# Patient Record
Sex: FEMALE | Race: WHITE | NOT HISPANIC OR LATINO | Employment: FULL TIME | URBAN - METROPOLITAN AREA
[De-identification: names, ages, dates, MRNs, and addresses within clinical notes are randomized per-mention and may not be internally consistent; named-entity substitution may affect disease eponyms.]

---

## 2022-08-19 ENCOUNTER — TELEPHONE (OUTPATIENT)
Dept: SURGERY | Facility: CLINIC | Age: 57
End: 2022-08-19
Payer: COMMERCIAL

## 2022-08-19 NOTE — TELEPHONE ENCOUNTER
Spoke with patient and appointment scheduled per referral. Details confirmed verbally with patient. Reminder slip placed in mail.

## 2022-08-19 NOTE — TELEPHONE ENCOUNTER
----- Message from Arthur Jones sent at 8/19/2022  9:50 AM CDT -----  Good morning,    The pt listed above is being referred from Bren[facility] Anders for (hernia affecting rectum severe symptoms). I have scanned the referral and records into . Please advise or contact pt to schedule appt at your earliest convenience.    Thank You,    [facility]

## 2022-08-25 ENCOUNTER — TELEPHONE (OUTPATIENT)
Dept: SURGERY | Facility: CLINIC | Age: 57
End: 2022-08-25
Payer: COMMERCIAL

## 2022-08-26 ENCOUNTER — TELEPHONE (OUTPATIENT)
Dept: SURGERY | Facility: CLINIC | Age: 57
End: 2022-08-26
Payer: COMMERCIAL

## 2022-08-26 ENCOUNTER — OFFICE VISIT (OUTPATIENT)
Dept: SURGERY | Facility: CLINIC | Age: 57
End: 2022-08-26
Payer: COMMERCIAL

## 2022-08-26 VITALS
SYSTOLIC BLOOD PRESSURE: 138 MMHG | DIASTOLIC BLOOD PRESSURE: 65 MMHG | WEIGHT: 127.69 LBS | HEIGHT: 63 IN | HEART RATE: 92 BPM | BODY MASS INDEX: 22.62 KG/M2

## 2022-08-26 DIAGNOSIS — K62.3 RECTAL PROLAPSE: Primary | ICD-10-CM

## 2022-08-26 PROCEDURE — 1159F PR MEDICATION LIST DOCUMENTED IN MEDICAL RECORD: ICD-10-PCS | Mod: CPTII,S$GLB,, | Performed by: SURGERY

## 2022-08-26 PROCEDURE — 99999 PR PBB SHADOW E&M-EST. PATIENT-LVL III: ICD-10-PCS | Mod: PBBFAC,,, | Performed by: SURGERY

## 2022-08-26 PROCEDURE — 3008F BODY MASS INDEX DOCD: CPT | Mod: CPTII,S$GLB,, | Performed by: SURGERY

## 2022-08-26 PROCEDURE — 99999 PR PBB SHADOW E&M-EST. PATIENT-LVL III: CPT | Mod: PBBFAC,,, | Performed by: SURGERY

## 2022-08-26 PROCEDURE — 3075F SYST BP GE 130 - 139MM HG: CPT | Mod: CPTII,S$GLB,, | Performed by: SURGERY

## 2022-08-26 PROCEDURE — 4010F ACE/ARB THERAPY RXD/TAKEN: CPT | Mod: CPTII,S$GLB,, | Performed by: SURGERY

## 2022-08-26 PROCEDURE — 3008F PR BODY MASS INDEX (BMI) DOCUMENTED: ICD-10-PCS | Mod: CPTII,S$GLB,, | Performed by: SURGERY

## 2022-08-26 PROCEDURE — 4010F PR ACE/ARB THEARPY RXD/TAKEN: ICD-10-PCS | Mod: CPTII,S$GLB,, | Performed by: SURGERY

## 2022-08-26 PROCEDURE — 3078F DIAST BP <80 MM HG: CPT | Mod: CPTII,S$GLB,, | Performed by: SURGERY

## 2022-08-26 PROCEDURE — 99204 PR OFFICE/OUTPT VISIT, NEW, LEVL IV, 45-59 MIN: ICD-10-PCS | Mod: S$GLB,,, | Performed by: SURGERY

## 2022-08-26 PROCEDURE — 3075F PR MOST RECENT SYSTOLIC BLOOD PRESS GE 130-139MM HG: ICD-10-PCS | Mod: CPTII,S$GLB,, | Performed by: SURGERY

## 2022-08-26 PROCEDURE — 99204 OFFICE O/P NEW MOD 45 MIN: CPT | Mod: S$GLB,,, | Performed by: SURGERY

## 2022-08-26 PROCEDURE — 1159F MED LIST DOCD IN RCRD: CPT | Mod: CPTII,S$GLB,, | Performed by: SURGERY

## 2022-08-26 PROCEDURE — 3078F PR MOST RECENT DIASTOLIC BLOOD PRESSURE < 80 MM HG: ICD-10-PCS | Mod: CPTII,S$GLB,, | Performed by: SURGERY

## 2022-08-26 RX ORDER — CLONAZEPAM 0.5 MG/1
0.5 TABLET ORAL 2 TIMES DAILY PRN
COMMUNITY

## 2022-08-26 RX ORDER — AMLODIPINE BESYLATE 5 MG/1
5 TABLET ORAL DAILY
COMMUNITY

## 2022-08-26 RX ORDER — ESCITALOPRAM OXALATE 20 MG/1
20 TABLET ORAL DAILY
COMMUNITY

## 2022-08-26 RX ORDER — ATOMOXETINE 80 MG/1
80 CAPSULE ORAL DAILY
COMMUNITY

## 2022-08-26 RX ORDER — DEXLANSOPRAZOLE 60 MG/1
60 CAPSULE, DELAYED RELEASE ORAL DAILY
Status: ON HOLD | COMMUNITY
End: 2023-10-18 | Stop reason: HOSPADM

## 2022-08-26 RX ORDER — ENALAPRIL MALEATE 20 MG/1
20 TABLET ORAL DAILY
Status: ON HOLD | COMMUNITY
End: 2023-10-04 | Stop reason: HOSPADM

## 2022-08-26 NOTE — TELEPHONE ENCOUNTER
Spoke with pt to inform of MRI appt made on 9/15/22 and clinic appt made on 9/16/22 with Dr. Zamora. Will confirm time and date with Dr. Zamora regarding appt times and confirm with patient. Pt verbalized understanding.

## 2022-08-30 ENCOUNTER — PATIENT MESSAGE (OUTPATIENT)
Dept: UROGYNECOLOGY | Facility: CLINIC | Age: 57
End: 2022-08-30
Payer: COMMERCIAL

## 2022-09-15 ENCOUNTER — HOSPITAL ENCOUNTER (OUTPATIENT)
Dept: RADIOLOGY | Facility: HOSPITAL | Age: 57
Discharge: HOME | End: 2022-09-15
Attending: SURGERY
Payer: COMMERCIAL

## 2022-09-15 DIAGNOSIS — K62.3 RECTAL PROLAPSE: ICD-10-CM

## 2022-09-15 PROCEDURE — 72195 MRI DEFECOGRAPHY: ICD-10-PCS | Mod: 26,,, | Performed by: RADIOLOGY

## 2022-09-15 PROCEDURE — 72195 MRI PELVIS W/O DYE: CPT | Mod: 26,,, | Performed by: RADIOLOGY

## 2022-09-15 PROCEDURE — 72195 MRI PELVIS W/O DYE: CPT | Mod: TC

## 2022-09-16 ENCOUNTER — OFFICE VISIT (OUTPATIENT)
Dept: SURGERY | Facility: CLINIC | Age: 57
End: 2022-09-16
Payer: COMMERCIAL

## 2022-09-16 ENCOUNTER — OFFICE VISIT (OUTPATIENT)
Dept: UROGYNECOLOGY | Facility: CLINIC | Age: 57
End: 2022-09-16
Payer: COMMERCIAL

## 2022-09-16 VITALS
HEART RATE: 73 BPM | HEIGHT: 63 IN | DIASTOLIC BLOOD PRESSURE: 76 MMHG | BODY MASS INDEX: 23.71 KG/M2 | WEIGHT: 133.81 LBS | SYSTOLIC BLOOD PRESSURE: 140 MMHG

## 2022-09-16 VITALS
HEART RATE: 73 BPM | DIASTOLIC BLOOD PRESSURE: 76 MMHG | SYSTOLIC BLOOD PRESSURE: 140 MMHG | WEIGHT: 133.81 LBS | BODY MASS INDEX: 23.71 KG/M2 | HEIGHT: 63 IN

## 2022-09-16 DIAGNOSIS — N39.46 URINARY INCONTINENCE, MIXED: Primary | ICD-10-CM

## 2022-09-16 DIAGNOSIS — N99.3 VAGINAL VAULT PROLAPSE, POSTHYSTERECTOMY: ICD-10-CM

## 2022-09-16 DIAGNOSIS — K62.3 RECTAL PROLAPSE: ICD-10-CM

## 2022-09-16 DIAGNOSIS — N81.6 RECTOCELE, FEMALE: ICD-10-CM

## 2022-09-16 DIAGNOSIS — K59.00 CONSTIPATION, UNSPECIFIED CONSTIPATION TYPE: ICD-10-CM

## 2022-09-16 DIAGNOSIS — R19.8 STRAINING WITH STOOLS: ICD-10-CM

## 2022-09-16 DIAGNOSIS — N95.2 VAGINAL ATROPHY: ICD-10-CM

## 2022-09-16 DIAGNOSIS — N81.11 CYSTOCELE, MIDLINE: ICD-10-CM

## 2022-09-16 DIAGNOSIS — K62.3 RECTAL PROLAPSE: Primary | ICD-10-CM

## 2022-09-16 DIAGNOSIS — M79.18 MYALGIA OF PELVIC FLOOR: ICD-10-CM

## 2022-09-16 PROCEDURE — 51701 PR INSERTION OF NON-INDWELLING BLADDER CATHETERIZATION FOR RESIDUAL UR: ICD-10-PCS | Mod: S$GLB,,, | Performed by: OBSTETRICS & GYNECOLOGY

## 2022-09-16 PROCEDURE — 99999 PR PBB SHADOW E&M-EST. PATIENT-LVL IV: ICD-10-PCS | Mod: PBBFAC,,, | Performed by: OBSTETRICS & GYNECOLOGY

## 2022-09-16 PROCEDURE — 4010F ACE/ARB THERAPY RXD/TAKEN: CPT | Mod: CPTII,S$GLB,, | Performed by: SURGERY

## 2022-09-16 PROCEDURE — 1159F PR MEDICATION LIST DOCUMENTED IN MEDICAL RECORD: ICD-10-PCS | Mod: CPTII,S$GLB,, | Performed by: OBSTETRICS & GYNECOLOGY

## 2022-09-16 PROCEDURE — 51701 INSERT BLADDER CATHETER: CPT | Mod: S$GLB,,, | Performed by: OBSTETRICS & GYNECOLOGY

## 2022-09-16 PROCEDURE — 99213 PR OFFICE/OUTPT VISIT, EST, LEVL III, 20-29 MIN: ICD-10-PCS | Mod: S$GLB,,, | Performed by: SURGERY

## 2022-09-16 PROCEDURE — 3008F BODY MASS INDEX DOCD: CPT | Mod: CPTII,S$GLB,, | Performed by: SURGERY

## 2022-09-16 PROCEDURE — 87086 URINE CULTURE/COLONY COUNT: CPT | Performed by: OBSTETRICS & GYNECOLOGY

## 2022-09-16 PROCEDURE — 3077F SYST BP >= 140 MM HG: CPT | Mod: CPTII,S$GLB,, | Performed by: SURGERY

## 2022-09-16 PROCEDURE — 3078F PR MOST RECENT DIASTOLIC BLOOD PRESSURE < 80 MM HG: ICD-10-PCS | Mod: CPTII,S$GLB,, | Performed by: SURGERY

## 2022-09-16 PROCEDURE — 3077F SYST BP >= 140 MM HG: CPT | Mod: CPTII,S$GLB,, | Performed by: OBSTETRICS & GYNECOLOGY

## 2022-09-16 PROCEDURE — 3078F DIAST BP <80 MM HG: CPT | Mod: CPTII,S$GLB,, | Performed by: SURGERY

## 2022-09-16 PROCEDURE — 4010F ACE/ARB THERAPY RXD/TAKEN: CPT | Mod: CPTII,S$GLB,, | Performed by: OBSTETRICS & GYNECOLOGY

## 2022-09-16 PROCEDURE — 3008F PR BODY MASS INDEX (BMI) DOCUMENTED: ICD-10-PCS | Mod: CPTII,S$GLB,, | Performed by: OBSTETRICS & GYNECOLOGY

## 2022-09-16 PROCEDURE — 4010F PR ACE/ARB THEARPY RXD/TAKEN: ICD-10-PCS | Mod: CPTII,S$GLB,, | Performed by: OBSTETRICS & GYNECOLOGY

## 2022-09-16 PROCEDURE — 1160F RVW MEDS BY RX/DR IN RCRD: CPT | Mod: CPTII,S$GLB,, | Performed by: OBSTETRICS & GYNECOLOGY

## 2022-09-16 PROCEDURE — 3078F DIAST BP <80 MM HG: CPT | Mod: CPTII,S$GLB,, | Performed by: OBSTETRICS & GYNECOLOGY

## 2022-09-16 PROCEDURE — 99204 OFFICE O/P NEW MOD 45 MIN: CPT | Mod: 25,S$GLB,, | Performed by: OBSTETRICS & GYNECOLOGY

## 2022-09-16 PROCEDURE — 3077F PR MOST RECENT SYSTOLIC BLOOD PRESSURE >= 140 MM HG: ICD-10-PCS | Mod: CPTII,S$GLB,, | Performed by: SURGERY

## 2022-09-16 PROCEDURE — 4010F PR ACE/ARB THEARPY RXD/TAKEN: ICD-10-PCS | Mod: CPTII,S$GLB,, | Performed by: SURGERY

## 2022-09-16 PROCEDURE — 99999 PR PBB SHADOW E&M-EST. PATIENT-LVL IV: CPT | Mod: PBBFAC,,, | Performed by: OBSTETRICS & GYNECOLOGY

## 2022-09-16 PROCEDURE — 3008F PR BODY MASS INDEX (BMI) DOCUMENTED: ICD-10-PCS | Mod: CPTII,S$GLB,, | Performed by: SURGERY

## 2022-09-16 PROCEDURE — 3008F BODY MASS INDEX DOCD: CPT | Mod: CPTII,S$GLB,, | Performed by: OBSTETRICS & GYNECOLOGY

## 2022-09-16 PROCEDURE — 99999 PR PBB SHADOW E&M-EST. PATIENT-LVL III: CPT | Mod: PBBFAC,,, | Performed by: SURGERY

## 2022-09-16 PROCEDURE — 1159F MED LIST DOCD IN RCRD: CPT | Mod: CPTII,S$GLB,, | Performed by: OBSTETRICS & GYNECOLOGY

## 2022-09-16 PROCEDURE — 1160F PR REVIEW ALL MEDS BY PRESCRIBER/CLIN PHARMACIST DOCUMENTED: ICD-10-PCS | Mod: CPTII,S$GLB,, | Performed by: OBSTETRICS & GYNECOLOGY

## 2022-09-16 PROCEDURE — 3078F PR MOST RECENT DIASTOLIC BLOOD PRESSURE < 80 MM HG: ICD-10-PCS | Mod: CPTII,S$GLB,, | Performed by: OBSTETRICS & GYNECOLOGY

## 2022-09-16 PROCEDURE — 99999 PR PBB SHADOW E&M-EST. PATIENT-LVL III: ICD-10-PCS | Mod: PBBFAC,,, | Performed by: SURGERY

## 2022-09-16 PROCEDURE — 99204 PR OFFICE/OUTPT VISIT, NEW, LEVL IV, 45-59 MIN: ICD-10-PCS | Mod: 25,S$GLB,, | Performed by: OBSTETRICS & GYNECOLOGY

## 2022-09-16 PROCEDURE — 99213 OFFICE O/P EST LOW 20 MIN: CPT | Mod: S$GLB,,, | Performed by: SURGERY

## 2022-09-16 PROCEDURE — 3077F PR MOST RECENT SYSTOLIC BLOOD PRESSURE >= 140 MM HG: ICD-10-PCS | Mod: CPTII,S$GLB,, | Performed by: OBSTETRICS & GYNECOLOGY

## 2022-09-16 RX ORDER — MELOXICAM 15 MG/1
15 TABLET ORAL EVERY MORNING
COMMUNITY
Start: 2022-09-01 | End: 2024-03-01

## 2022-09-16 RX ORDER — GABAPENTIN 300 MG/1
CAPSULE ORAL
COMMUNITY
Start: 2022-09-01

## 2022-09-16 RX ORDER — BUSPIRONE HYDROCHLORIDE 15 MG/1
TABLET ORAL
COMMUNITY
Start: 2022-08-22 | End: 2024-03-01

## 2022-09-16 NOTE — PROGRESS NOTES
Colon & Rectal Surgery Clinic Follow Up    HPI:   Charla Sharif is a 57 y.o. female who presents for follow up of rectal prolapse.  Continues to have prolapsing tissue that she has to manually reduce.  Now reporting fecal incontinence.  Does not feel that she evacuates completely after bowel movements.  Recently obtained custody of her grandchildren.         Objective:   Vitals:    09/16/22 0932   BP: (!) 140/76   Pulse: 73        Physical Exam   Gen: well developed female, NAD  HEENT: normocephalic, atraumatic, PERRL, EOMI   CV: RRR, no murmurs  Resp: nonlabored, CTAB   Abd: soft, NTND   MSK: no gross deformities   Anorectal: some mucosal prolapse with straining, no full thickness prolapse on exam       Impression:     1.  Anatomic findings: Postop change of hysterectomy.     2.  Functional evaluation: Normal anorectal junction location and widened levator hiatus width at rest with mild/Grade 1 (2-4 cm) descent and mild/Grade 1 (6-8 cm) widening during defecation.     3.  Anterior compartment findings: Grade 1 cystocele.     4.  Middle compartment findings: Normal.     5.  Posterior compartment findings: Grade 1 peritoneocele and moderate rectocele.     Electronically signed by resident: Randy Issa  Date:                                            09/15/2022  Time:                                           14:52     Electronically signed by: Ajith Muro MD  Date:                                            09/15/2022  Time:                                           15:39    Assessment and Plan:   Charla Sharif  is a 57 y.o. female who presents for follow up of rectal prolapse and pelvic floor dysfunction    - Patient to see Dr. Barton later today for evaluation of anterior compartment pathology   - Patient does have mucosal prolapse on exam, no full thickness prolapse seen.  We discussed options for repair   - Will discuss options with Dr. Barton and follow up with Ms. Sharif.  She has recently acquired  custody of her grandchildren and will most likely delay any surgical intervention until the beginning of the year.       Adry Zamora MD  Staff Surgeon   Colon & Rectal Surgery

## 2022-09-16 NOTE — PROGRESS NOTES
[facility] - UROGYNECOLOGY  [address] [address]    Charla Sharif  [id number]  1965 September 16, 2022    Consulting Physician: Adry Zamora MD   GYN: Franco Lala MD ([place])  Primary M.D.: To Obtain Unable    Chief Complaint   Patient presents with    Vaginal Prolapse    Urinary Retention       HPI:     1)  UI:  (+) BECKY > (+) UUI  X 1 year.  (+) pads (for fecal issues, no urine on pads).  Daytime frequency: Q 1 hours.  Nocturia: Yes: 2/night.   (+) dysuria,  (--) hematuria,  (--) frequent UTIs.  (--) complete bladder emptying. +PV dribbling. DV with variable 2nd amount.     2)  POP:  Present x 1-2 months. To introitus.  Symptoms:(+)  pressure.  (--) vaginal bleeding. (--) vaginal discharge. (+) sexually active.  (--) dyspareunia.  (+)  Vaginal dryness. (+) vaginal estrogen use--uses 0.5 3x/week--not consistent.     3)  BM:  (+) constipation/straining x several months since rectal prolapse. Was not constipated before that. Taking linzess-not sure is helping. Has BM Q4 days.   (--) chronic diarrhea. (--) hematochezia.  + fecal incontinence (2x/week).  (--) fecal smearing/urgency.  (--) complete evacuation.  NO splinting.   --2 PPD for fecal smearing   --mucus discharge+  --rectal prolapse x 2 months  --8/2022 dynamic MRI:  Impression:  1.  Anatomic findings: Postop change of hysterectomy.  2.  Functional evaluation: Normal anorectal junction location and widened levator hiatus width at rest with mild/Grade 1 (2-4 cm) descent and mild/Grade 1 (6-8 cm) widening during defecation.  3.  Anterior compartment findings: Grade 1 cystocele.  4.  Middle compartment findings: Normal.  5.  Posterior compartment findings: Grade 1 peritoneocele and moderate rectocele.    Past Medical History  Past Medical History:   Diagnosis Date    ADD (attention deficit disorder)     Anxiety    HTN  Sciatica: takes gabapentin + mobic daily     Past Surgical History  Past Surgical History:   Procedure  Laterality Date    CHOLECYSTECTOMY      HYSTERECTOMY     --LSC esther     Hysterectomy: Yes   Date: .  Indication: emergent for ovarian cyst rupture.    Type: xlap/Pfannenstiel  Cervix present: No  Ovaries present: No  Other procedures at time of hysterectomy:  none    Past Ob History     x 2.  C/s x 0.    Largest infant weight: 9#  yes FAVD. yes episiotomy.      Gynecologic History  LMP: No LMP recorded.  Age of menarche: 8th grade  Age of menopause: with hyst  Menstrual history: h/o menorrhagia  Pap test: post IVA.  History of abnormal paps: No.  History of STIs:  No  Mammogram: Date of last:  ([facility]).  Result: Normal per report.  Colonoscopy: Date of last:  ([facility]).  Result:  normal per report.  Repeat due:  per CRS/PCP.    DEXA:  none    Family History  History reviewed. No pertinent family history.   Colon CA: No  Breast CA: No  GYN CA: No   CA: No    Social History  Social History     Tobacco Use   Smoking Status Not on file   Smokeless Tobacco Not on file     Cessation date: +social smoking and vaping   Social History     Substance and Sexual Activity   Alcohol Use None   .    Social History     Substance and Sexual Activity   Drug Use Not on file     The patient is .  Resides in [place] [address].  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 Takes care of parents and getting custody of grandchildren.     Allergies  Review of patient's allergies indicates:   Allergen Reactions    Promethazine Other (See Comments)     sweats       Medications  Current Outpatient Medications on File Prior to Visit   Medication Sig Dispense Refill    amLODIPine (NORVASC) 5 MG tablet Take 5 mg by mouth once daily.      atomoxetine (STRATTERA) 80 MG capsule Take 80 mg by mouth once daily.      busPIRone (BUSPAR) 15 MG tablet TAKE 1/2 TABLET BY MOUTH 3 TIMES DAILY      clonazePAM (KLONOPIN) 0.5 MG tablet Take 0.5 mg by mouth 2 (two) times daily as needed for  "Anxiety.      dexlansoprazole (DEXILANT) 60 mg capsule Take 60 mg by mouth once daily.      enalapril (VASOTEC) 20 MG tablet Take 20 mg by mouth once daily.      EScitalopram oxalate (LEXAPRO) 20 MG tablet Take 20 mg by mouth once daily.      gabapentin (NEURONTIN) 300 MG capsule TAKE 1 CAPSULE BY MOUTH 3 TIMES DAILY      linaCLOtide (LINZESS) 290 mcg Cap capsule Take 290 mcg by mouth before breakfast.      meloxicam (MOBIC) 15 MG tablet Take 15 mg by mouth every morning.       No current facility-administered medications on file prior to visit.       Review of Systems A 14 point ROS was reviewed with pertinent positives as noted above in the history of present illness.      Constitutional: negative  Eyes: negative  Endocrine: negative  Gastrointestinal: negative  Cardiovascular: negative  Respiratory: negative  Allergic/Immunologic: negative  Integumentary: negative  Psychiatric: negative  Musculoskeletal: negative   Ear/Nose/Throat: negative  Neurologic: negative  Genitourinary: SEE HPI  Hematologic/Lymphatic: negative   Breast: negative    Urogynecologic Exam  BP (!) 140/76   Pulse 73   Ht 5' 2.5" (1.588 m)   Wt 60.7 kg (133 lb 13.1 oz)   BMI 24.09 kg/m²     GENERAL APPEARANCE:  The patient is well-developed, well-nourished.  Neck:  Supple with no thyromegaly, no carotid bruits.  Heart:  Regular rate and rhythm, no murmurs, rubs or gallops.  Lungs:  Clear.  No CVA tenderness.  Abdomen:  Soft, nontender, nondistended, no hepatosplenomegaly.  Incisions:  LSC and Pfannenstiel well-healed    PELVIC:    External genitalia:  Normal Bartholins, Skenes and labia bilaterally.    Urethra:  No caruncle, diverticulum or masses.  (+) hypermobility.    Vagina:  Atrophy (+) , no bladder masses or tender, no discharge.  +TTP LV B.  Cervix:  absent  Uterus: uterus absent  Adnexa: Not palpable.    POP-Q:  Aa 0; Ba 0; C -8; Ap 0; Bp 0.  Genital hiatus 4, perineal body 2, total vaginal length 10.    NEUROLOGIC:  Cranial nerves 2 " through 12 intact.  Strength 5/5.  DTRs 2+ lower extremities.  S2 through 4 normal.  Sacral reflexes intact.    EXT: NOVAK, 2+ pulses bilaterally, no C/C/E    COUGH STRESS TEST:  negative  KEGEL: 1 /5    RECTAL:    External:  Normal, (--) hemorrhoids, (--) dovetailing.   Internal:   (--) tenderness, (--) masses, Normal resting tone, Normal active tone.    PVR: 40 mL    Impression    No diagnosis found.    Initial Plan  The patient was counseled regarding these issues. The patient was given a summary sheet containing each of these issues with possible options for evaluation and management. When appropriate, we also reviewed computer-generated diagrams specific to their diagnoses..  All questions were addressed to the patient's satisfaction.    1) Rectal prolapse:  --management per Dr. Zamora in CRS  --will work to minimize straining with BMs (see below)    2)  Vaginal atrophy (dryness):  Use 0.5 gram of estrogen cream in vagina nightly x 2 weeks, then twice a week thereafter.    --will help general discomfort and drynes    3)  Mixed urinary incontinence, urge < stress:    --Empty bladder every 3 hours.  Empty well: wait a minute, lean forward on toilet.    --Avoid dietary irritants (see sheet).  Keep diary x 3-5 days to determine your irritants.  --start pelvic floor PT  --URGE: consider medication in future. Takes 2-4 weeks to see if will have effect.  For dry mouth: get sour, sugar free lozenge or gum.    --STRESS:  Pessary vs. Sling.    --UDS preop to see if needs concomitant MUS    4)  constipation/straining with stools:  --continue daily linzess  --hydrate well  --start fiber supplement  --would start pelvic floor PT to work on minimizing straining with BMs; also needs plan to optimize constipation control (taking 290 mcg linzess with ? Improvement)--may be impacted by rectal prolapse    --has appt with TYRONE Caban; requests PT closer to home; given # for [facility] PT--referral sent; would keep [facility] appt if [facility]  appt is far in future  --Pavithra Uribe. [facility] in [place], [place]:  (p) [phone].  (f) [phone].       5)  Stage 2 cystocele/rectocele, stage 1 vaginal vault prolapse:  --discussed  --options: observation vs pessary/PT (won't get rid of bulge but can help pressure) vs surgery   --if surgery: robotic/laparoscopic sacral colpopexy vs uterosacral suspension (would lean toward ASC due to rectal prolapse)   --if surgery: discuss plan with Dr. Zamora (would she do ventral mesh rectopexy or suture rectopexy?)   --if surgery: needs UDS/cysto preop to determine need for concomitant MUS for BECKY   --if surgery: needs clearance per PCP + labs (CBC, CMP, T&S)/EKG   --if surgery: would start pelvic floor PT to work on minimizing straining with BMs; also needs plan to optimize constipation control (taking 290 mcg linzess with ? Improvement)--may be impacted by rectal prolapse    --has appt with TYRONE Caban; requests PT closer to home; given # for [facility] PT--referral sent; would keep Arsh appt if [facility] appt is far in future  --Pavithra Uribe. [facility] [place], [place]:  (p) [phone].  (f) [phone].         6)  Will discuss with Dr. Zamora and make plan for concomitant surgery. Patient would like to wait until late 2022/early 2023 for OR since has recently gained custody of her grandchildren and needs to settle affairs.      Approximately 45 min were spent in consult, 90 % in discussion.   Patient's  was present for the entire exam and discussion.  Thank you for requesting consultation of your patient.  I look forward to participating in their care.    Carrol Barton  Female Pelvic Medicine and Reconstructive Surgery  [facility] [place], [place]

## 2022-09-18 PROBLEM — K59.00 CONSTIPATION: Status: ACTIVE | Noted: 2022-09-18

## 2022-09-18 PROBLEM — N39.46 URINARY INCONTINENCE, MIXED: Status: ACTIVE | Noted: 2022-09-18

## 2022-09-18 PROBLEM — K62.3 RECTAL PROLAPSE: Status: ACTIVE | Noted: 2022-09-18

## 2022-09-18 PROBLEM — N81.11 CYSTOCELE, MIDLINE: Status: ACTIVE | Noted: 2022-09-18

## 2022-09-18 PROBLEM — R19.8 STRAINING WITH STOOLS: Status: ACTIVE | Noted: 2022-09-18

## 2022-09-18 PROBLEM — N95.2 VAGINAL ATROPHY: Status: ACTIVE | Noted: 2022-09-18

## 2022-09-18 PROBLEM — N99.3 VAGINAL VAULT PROLAPSE, POSTHYSTERECTOMY: Status: ACTIVE | Noted: 2022-09-18

## 2022-09-18 PROBLEM — M79.18 MYALGIA OF PELVIC FLOOR: Status: ACTIVE | Noted: 2022-09-18

## 2022-09-18 PROBLEM — N81.6 RECTOCELE, FEMALE: Status: ACTIVE | Noted: 2022-09-18

## 2022-09-18 LAB — BACTERIA UR CULT: NO GROWTH

## 2022-09-18 RX ORDER — ESTRADIOL 0.1 MG/G
CREAM VAGINAL
Qty: 42.5 G | Refills: 11 | Status: SHIPPED | OUTPATIENT
Start: 2022-09-18 | End: 2023-06-12 | Stop reason: SDUPTHER

## 2022-09-19 ENCOUNTER — TELEPHONE (OUTPATIENT)
Dept: UROGYNECOLOGY | Facility: CLINIC | Age: 57
End: 2022-09-19
Payer: COMMERCIAL

## 2022-09-19 NOTE — TELEPHONE ENCOUNTER
----- Message from Carrol Barton MD sent at 9/18/2022  9:30 PM CDT -----  Please let the patient know urine C&S was negative for infection.  Please call--she doesn't have portal set up. Thanks!

## 2022-09-19 NOTE — TELEPHONE ENCOUNTER
Spoke with patient, stated prescription was sent in to her pharmacy. Patient verbalized understanding, call ended.     Also, faxed PT referral this morning.

## 2022-09-19 NOTE — TELEPHONE ENCOUNTER
Spoke to patient.  Let the patient know urine C&S was negative for infection.  Patient verbalized understanding.  Call ended.

## 2022-09-19 NOTE — TELEPHONE ENCOUNTER
----- Message from Carrol Barton MD sent at 9/18/2022  9:13 PM CDT -----  Regarding: please let patient know that I sent Rx for vaginal estrogen to start  Hi: can you please let patient know that I sent in Rx for vaginal estrogen for her to start. This will help vaginal dryness and keep tissue healthy for surgery.  Please  and start.  Does not increase risk of breast cancer from general population risk and does not increase risk of blood clots. Thanks!    Vaginal atrophy (dryness):  Use 1 gram of estrogen cream in vagina nightly x 2 weeks, then twice a week thereafter.    Rx sent to:\[facility]\"" pharmacy    Please also remind her to start PT in [place] or [place], whichever can get her in sooner.     Thanks!

## 2022-11-10 ENCOUNTER — TELEPHONE (OUTPATIENT)
Dept: SURGERY | Facility: CLINIC | Age: 57
End: 2022-11-10
Payer: COMMERCIAL

## 2022-11-10 NOTE — TELEPHONE ENCOUNTER
"Spoke with patient regarding upcoming surgery with Dr. Zamora and Dr. Gavin. Explained that Dr. Zamora is still out on maternity leave but is expected to return mid December. Pt stated, "she would like to have procedure in December because her  will be off from work and able to drive her into city." Will update upon Dr. Zamora return to schedule in December if possible. Pt verbalized understanding.       ----- Message from Rodriguez Irby sent at 11/10/2022 12:20 PM CST -----  Contact: Patient  The pt called and would like to have someone call her back    This is regarding surgery    The pt can be reached at [phone]    "

## 2022-12-13 ENCOUNTER — TELEPHONE (OUTPATIENT)
Dept: UROGYNECOLOGY | Facility: CLINIC | Age: 57
End: 2022-12-13
Payer: COMMERCIAL

## 2022-12-13 ENCOUNTER — TELEPHONE (OUTPATIENT)
Dept: SURGERY | Facility: CLINIC | Age: 57
End: 2022-12-13
Payer: COMMERCIAL

## 2022-12-13 NOTE — TELEPHONE ENCOUNTER
"Spoke with patient regarding procedure date. Informed pt that we have reached out to Dr. Barton's office letting her know that Dr. Zamora was back in clinic as of 12/12. Will reach out again to update providers. Pt states, "she needs to have procedure in December while  is still in town. Expected to leave in January, 2023."      ----- Message from Kate Schofield sent at 12/13/2022 11:11 AM CST -----  Contact: pt  Pt states she was under the impression she was suppose to have a procedure scheduled for next week. She would like to speak to the nurse in regards to when that will be scheduled.     Confirmed contact below:  Contact Name:Charlaacacia Sharif  Phone Number: [phone]          "

## 2022-12-13 NOTE — TELEPHONE ENCOUNTER
Called patient. Informed her that Dr. Barton's surgery schedule is booking out until March. Patient states once she has a date for surgery she will be able to arrange a ride. Patient wondering if she can come for visit earlier than the 23rd. Will look at Dr. Barton and Sera's schedule to look for sooner date. Patient also states she has decreased her vaping. States she only does it a few times per day.

## 2022-12-13 NOTE — TELEPHONE ENCOUNTER
Called patient. Patient was under the impression she was having her combined surgery with Dr. Barton and Dr. Zamora next week. Informed patient that Dr. Zamora just got back from maternity leave yesterday and that last we left it Dr. Barton and Dr. Zamora were going to discuss a surgical plan after she returned from maternity leave. Patient states she needs to have the surgery done in December since her  will not be available after that to take her to the hospital for the surgery. Informed patient that it is likely we aren't going to be able to do her surgery in December, however I will look into other resources for helping her get transportation to the hospital.    In the meantime, patient agreed to come in on 12/23 at 10am to discuss surgical plan with Sandra. At that time we can discuss a possible date.

## 2022-12-23 ENCOUNTER — TELEPHONE (OUTPATIENT)
Dept: UROGYNECOLOGY | Facility: CLINIC | Age: 57
End: 2022-12-23
Payer: COMMERCIAL

## 2022-12-23 ENCOUNTER — OFFICE VISIT (OUTPATIENT)
Dept: UROGYNECOLOGY | Facility: CLINIC | Age: 57
End: 2022-12-23
Payer: COMMERCIAL

## 2022-12-23 DIAGNOSIS — N81.11 CYSTOCELE, MIDLINE: ICD-10-CM

## 2022-12-23 DIAGNOSIS — N39.46 URINARY INCONTINENCE, MIXED: Primary | ICD-10-CM

## 2022-12-23 DIAGNOSIS — M79.18 MYALGIA OF PELVIC FLOOR: ICD-10-CM

## 2022-12-23 DIAGNOSIS — K59.00 CONSTIPATION, UNSPECIFIED CONSTIPATION TYPE: ICD-10-CM

## 2022-12-23 DIAGNOSIS — N99.3 VAGINAL VAULT PROLAPSE, POSTHYSTERECTOMY: ICD-10-CM

## 2022-12-23 DIAGNOSIS — K62.3 RECTAL PROLAPSE: ICD-10-CM

## 2022-12-23 DIAGNOSIS — R19.8 STRAINING WITH STOOLS: ICD-10-CM

## 2022-12-23 DIAGNOSIS — N95.2 VAGINAL ATROPHY: ICD-10-CM

## 2022-12-23 DIAGNOSIS — N81.6 RECTOCELE, FEMALE: ICD-10-CM

## 2022-12-23 PROCEDURE — 1160F PR REVIEW ALL MEDS BY PRESCRIBER/CLIN PHARMACIST DOCUMENTED: ICD-10-PCS | Mod: CPTII,95,, | Performed by: OBSTETRICS & GYNECOLOGY

## 2022-12-23 PROCEDURE — 4010F PR ACE/ARB THEARPY RXD/TAKEN: ICD-10-PCS | Mod: CPTII,95,, | Performed by: OBSTETRICS & GYNECOLOGY

## 2022-12-23 PROCEDURE — 1160F RVW MEDS BY RX/DR IN RCRD: CPT | Mod: CPTII,95,, | Performed by: OBSTETRICS & GYNECOLOGY

## 2022-12-23 PROCEDURE — 4010F ACE/ARB THERAPY RXD/TAKEN: CPT | Mod: CPTII,95,, | Performed by: OBSTETRICS & GYNECOLOGY

## 2022-12-23 PROCEDURE — 99213 OFFICE O/P EST LOW 20 MIN: CPT | Mod: 95,,, | Performed by: OBSTETRICS & GYNECOLOGY

## 2022-12-23 PROCEDURE — 1159F PR MEDICATION LIST DOCUMENTED IN MEDICAL RECORD: ICD-10-PCS | Mod: CPTII,95,, | Performed by: OBSTETRICS & GYNECOLOGY

## 2022-12-23 PROCEDURE — 99213 PR OFFICE/OUTPT VISIT, EST, LEVL III, 20-29 MIN: ICD-10-PCS | Mod: 95,,, | Performed by: OBSTETRICS & GYNECOLOGY

## 2022-12-23 PROCEDURE — 1159F MED LIST DOCD IN RCRD: CPT | Mod: CPTII,95,, | Performed by: OBSTETRICS & GYNECOLOGY

## 2022-12-23 NOTE — PROGRESS NOTES
Established Patient - Audio Only Telehealth Visit     The patient location is: home  The chief complaint leading to consultation is: follow up  Visit type: Virtual visit with audio only (telephone)  Total time spent with patient: 20 min    The reason for the audio only service rather than synchronous audio and video virtual visit was related to technical difficulties or patient preference/necessity.     Each patient to whom I provide medical services by telemedicine is:  (1) informed of the relationship between the physician and patient and the respective role of any other health care provider with respect to management of the patient; and (2) notified that they may decline to receive medical services by telemedicine and may withdraw from such care at any time. Patient verbally consented to receive this service via voice-only telephone call.      Urogyn follow up  12/23/2022    [facility]  [address] [place] [id number]    Charla Sharif  [id number]  1965      Charla Sharif is a 57 y.o. here for a urogyn follow up. The patient's last visit with me was on 9/16/2022.    1)  UI:  (+) BECKY > (+) UUI  X 1 year.  (+) pads (for fecal issues, no urine on pads).  Daytime frequency: Q 1 hours.  Nocturia: Yes: 2/night.   (+) dysuria,  (--) hematuria,  (--) frequent UTIs.  (--) complete bladder emptying. +PV dribbling. DV with variable 2nd amount.     2)  POP:  Present x 1-2 months. To introitus.  Symptoms:(+)  pressure.  (--) vaginal bleeding. (--) vaginal discharge. (+) sexually active.  (--) dyspareunia.  (+)  Vaginal dryness. (+) vaginal estrogen use--uses 0.5 3x/week--not consistent.     3)  BM:  (+) constipation/straining x several months since rectal prolapse. Was not constipated before that. Taking linzess-not sure is helping. Has BM Q4 days.   (--) chronic diarrhea. (--) hematochezia.  + fecal incontinence (2x/week).  (--) fecal smearing/urgency.  (--) complete evacuation.  NO splinting.    --2 PPD for fecal smearing   --mucus discharge+  --rectal prolapse x 2 months  --2022 dynamic MRI:  Impression:  1.  Anatomic findings: Postop change of hysterectomy.  2.  Functional evaluation: Normal anorectal junction location and widened levator hiatus width at rest with mild/Grade 1 (2-4 cm) descent and mild/Grade 1 (6-8 cm) widening during defecation.  3.  Anterior compartment findings: Grade 1 cystocele.  4.  Middle compartment findings: Normal.  5.  Posterior compartment findings: Grade 1 peritoneocele and moderate rectocele.    --initial exam:  PELVIC:    External genitalia:  Normal Bartholins, Skenes and labia bilaterally.    Urethra:  No caruncle, diverticulum or masses.  (+) hypermobility.    Vagina:  Atrophy (+) , no bladder masses or tender, no discharge.  +TTP LV B.  Cervix:  absent  Uterus: uterus absent  Adnexa: Not palpable  POP-Q:  Aa 0; Ba 0; C -8; Ap 0; Bp 0.  Genital hiatus 4, perineal body 2, total vaginal length 10.    Past Medical History  Past Medical History:   Diagnosis Date    ADD (attention deficit disorder)     Anxiety    HTN  Sciatica: takes gabapentin + mobic daily     Past Surgical History  Past Surgical History:   Procedure Laterality Date    CHOLECYSTECTOMY      HYSTERECTOMY     --[facility]     Hysterectomy: Yes   Date: .  Indication: emergent for ovarian cyst rupture.    Type: xlap/Pfannenstiel  Cervix present: No  Ovaries present: No  Other procedures at time of hysterectomy:  none    Past Ob History     x 2.  C/s x 0.    Largest infant weight: 9#  yes FAVD. yes episiotomy.      Gynecologic History  LMP: No LMP recorded.  Age of menarche: 8th grade  Age of menopause: with hyst  Menstrual history: h/o menorrhagia  Pap test: post IVA.  History of abnormal paps: No.  History of STIs:  No  Mammogram: Date of last:  ([facility]).  Result: Normal per report.  Colonoscopy: Date of last:  ([facility]).  Result:  normal per report.  Repeat due:  per  CRS/PCP.    DEXA:  none    Issues include:  Patient Active Problem List   Diagnosis    Urinary incontinence, mixed    Rectal prolapse    Vaginal vault prolapse, posthysterectomy    Myalgia of pelvic floor    Straining with stools    Constipation    Vaginal atrophy    Cystocele, midline    Rectocele, female       History since last visit:   1) Rectal prolapse:  --still feeling some tissue bulging at anal area, marlyn with more activities and straining with BMs  --now having some FI and mucus discharge    2)  Vaginal atrophy (dryness):  using vaginal estrogen 2x/week    3)  Mixed urinary incontinence, urge < stress:    --still having AMOS, may be worse    4)  constipation/straining with stools:  --taking trulance daily + miralax PRN  --certain foods trigger  --has not started daily fiber  --did not start PT    5)  Stage 2 cystocele/rectocele, stage 1 vaginal vault prolapse:  --still bothersome    Medications:    Current Outpatient Medications:     amLODIPine (NORVASC) 5 MG tablet, Take 5 mg by mouth once daily., Disp: , Rfl:     atomoxetine (STRATTERA) 80 MG capsule, Take 80 mg by mouth once daily., Disp: , Rfl:     busPIRone (BUSPAR) 15 MG tablet, TAKE 1/2 TABLET BY MOUTH 3 TIMES DAILY, Disp: , Rfl:     clonazePAM (KLONOPIN) 0.5 MG tablet, Take 0.5 mg by mouth 2 (two) times daily as needed for Anxiety., Disp: , Rfl:     dexlansoprazole (DEXILANT) 60 mg capsule, Take 60 mg by mouth once daily., Disp: , Rfl:     enalapril (VASOTEC) 20 MG tablet, Take 20 mg by mouth once daily., Disp: , Rfl:     EScitalopram oxalate (LEXAPRO) 20 MG tablet, Take 20 mg by mouth once daily., Disp: , Rfl:     estradioL (ESTRACE) 0.01 % (0.1 mg/gram) vaginal cream, 0.5 grams with applicator or dime-sized amount with finger in vagina nightly x 2 weeks, then twice a week thereafter, Disp: 42.5 g, Rfl: 11    gabapentin (NEURONTIN) 300 MG capsule, TAKE 1 CAPSULE BY MOUTH 3 TIMES DAILY, Disp: , Rfl:     linaCLOtide (LINZESS) 290 mcg Cap capsule, Take  290 mcg by mouth before breakfast., Disp: , Rfl:     meloxicam (MOBIC) 15 MG tablet, Take 15 mg by mouth every morning., Disp: , Rfl:     ROS:  As per HPI.      Exam  There were no vitals taken for this visit.  General: alert and oriented, no acute distress  Remainder of PE deferred, as visit was virtual.     Impression  1. Urinary incontinence, mixed        2. Rectal prolapse        3. Vaginal vault prolapse, posthysterectomy        4. Myalgia of pelvic floor        5. Straining with stools        6. Constipation, unspecified constipation type        7. Rectocele, female        8. Cystocele, midline        9. Vaginal atrophy          We reviewed the above issues and discussed options for short-term versus long-term management of her problems.   Plan:   1) Rectal prolapse:  --management per Dr. Zamora in CRS  --will work to minimize straining with BMs (see below)    2)  Vaginal atrophy (dryness):  Use 0.5 gram of estrogen cream in vagina nightly x 2 weeks, then twice a week thereafter.    --will help general discomfort and drynes    3)  Mixed urinary incontinence, urge < stress:    --Empty bladder every 3 hours.  Empty well: wait a minute, lean forward on toilet.    --Avoid dietary irritants (see sheet).  Keep diary x 3-5 days to determine your irritants.  --start pelvic floor PT  --URGE: consider medication in future. Takes 2-4 weeks to see if will have effect.  For dry mouth: get sour, sugar free lozenge or gum.    --STRESS:  Pessary vs. Sling.    --UDS preop to see if needs concomitant MUS    4)  constipation/straining with stools:  --continue daily linzess  --hydrate well  --start fiber supplement  --would start pelvic floor PT to work on minimizing straining with BMs; also needs plan to optimize constipation control (taking 290 mcg linzess with ? Improvement)--may be impacted by rectal prolapse    --has appt with TYRONE Caban; requests PT closer to home; given # for [facility]--referral sent; would keep [facility]  appt if [facility] appt is far in future  --Pavith[facility] Uribe. [facility] [place], [place]:  (p) [phone].  (f) [phone].       5)  Stage 2 cystocele/rectocele, stage 1 vaginal vault prolapse:  --discussed  --options: observation vs pessary/PT (won't get rid of bulge but can help pressure) vs surgery   --if surgery: robotic/laparoscopic sacral colpopexy vs uterosacral suspension (would lean toward ASC due to rectal prolapse)   --if surgery: discuss plan with Dr. Zamora (would she do ventral mesh rectopexy or suture rectopexy?)   --if surgery: needs UDS/cysto preop to determine need for concomitant MUS for BECKY   --if surgery: needs clearance per PCP + labs (CBC, CMP, T&S)/EKG   --if surgery: would start pelvic floor PT to work on minimizing straining with BMs; also needs plan to optimize constipation control (taking 290 mcg linzess with ? Improvement)--may be impacted by rectal prolapse    --has appt with TYRONE Caban; requests PT closer to home; given # for [facility]--referral sent; would keep [facility] appt if [facility] appt is far in future  --[facility]av[facility] Uribe. [facility] [place]:  (p) [phone].  (f) [phone].       6)  Will discuss with Dr. Zamora and make plan for concomitant surgery. Patient would like to wait until late 2022/early 2023 for OR since has recently gained custody of her grandchildren and needs to settle affairs.      6)  Vapes sometimes: advised to stop to help with surgical healing.     20 minutes were spent in face to face time with this patient  100 % of this time was spent in counseling and/or coordination of care     Carrol Barton MD  [facility]  Division of Female Pelvic Medicine and Reconstructive Surgery  Department of Obstetrics & Gynecology

## 2022-12-23 NOTE — TELEPHONE ENCOUNTER
Called patient regarding canceled appointment with Dr. Zamora this morning. Patient states she is sick, won't be making it in to see Dr. Zamora or [facility]. Will help patient reschedule.

## 2023-01-04 NOTE — PROGRESS NOTES
Colon & Rectal Surgery Clinic Follow Up    HPI:   Charla Sharif is a 57 y.o. female who presents for follow up of prolapsing rectal tissue.  At prior visits, unclear if this was full thickness rectal prolapse or prolapsing hemorrhoids.       Objective:   Vitals:    01/06/23 0945   BP: 128/84   Pulse: 79   Resp: 19        Physical Exam   Gen: well developed female, NAD  HEENT: normocephalic, atraumatic, PERRL, EOMI   CV: RRR, no murmurs  Resp: nonlabored, CTAB   Abd: soft, NTND  MSK: no gross deformities   Anorectal: external hemorrhoid skin tags in the left anterolateral and right posterolateral columns, FLACO with decreased tone, diminished squeeze with use of accessory muscles, valsalva with significant perineal descent. No full thickness rectal prolapse     Anoscopy:   Verbal consent obtained  A lubricated anoscope was inserted and circumferential inspection performed. Nonbleeding hemorrhoids noted.   The patient tolerated the procedure well and the scope was withdrawn.       Assessment and Plan:   Charla Sharif  is a 57 y.o. female who presents for follow up of prolapsing hemorrhoids    - No evidence of full thickness rectal prolapse  - We discussed risks, benefits and alternatives of excisional hemorrhoidectomy.  We discussed postoperative pain expectations, risk of bleeding, and infection.  Consent obtained  - Will coordinate surgery with Dr. Barton.       Adry Zamora MD  Staff Surgeon   Colon & Rectal Surgery

## 2023-01-05 DIAGNOSIS — N99.3 VAGINAL VAULT PROLAPSE, POSTHYSTERECTOMY: ICD-10-CM

## 2023-01-05 DIAGNOSIS — N81.11 CYSTOCELE, MIDLINE: ICD-10-CM

## 2023-01-05 DIAGNOSIS — N39.46 URINARY INCONTINENCE, MIXED: Primary | ICD-10-CM

## 2023-01-06 ENCOUNTER — TELEPHONE (OUTPATIENT)
Dept: UROGYNECOLOGY | Facility: CLINIC | Age: 58
End: 2023-01-06
Payer: COMMERCIAL

## 2023-01-06 ENCOUNTER — OFFICE VISIT (OUTPATIENT)
Dept: SURGERY | Facility: CLINIC | Age: 58
End: 2023-01-06
Payer: COMMERCIAL

## 2023-01-06 VITALS
SYSTOLIC BLOOD PRESSURE: 128 MMHG | OXYGEN SATURATION: 100 % | WEIGHT: 130.5 LBS | HEART RATE: 79 BPM | DIASTOLIC BLOOD PRESSURE: 84 MMHG | BODY MASS INDEX: 23.12 KG/M2 | RESPIRATION RATE: 19 BRPM | HEIGHT: 63 IN

## 2023-01-06 DIAGNOSIS — N81.6 RECTOCELE, FEMALE: ICD-10-CM

## 2023-01-06 DIAGNOSIS — N39.46 URINARY INCONTINENCE, MIXED: Primary | ICD-10-CM

## 2023-01-06 DIAGNOSIS — N81.11 CYSTOCELE, MIDLINE: ICD-10-CM

## 2023-01-06 DIAGNOSIS — Z01.818 PRE-OP EVALUATION: ICD-10-CM

## 2023-01-06 DIAGNOSIS — K64.8 INTERNAL PROLAPSED HEMORRHOIDS: Primary | ICD-10-CM

## 2023-01-06 DIAGNOSIS — K64.8 INTERNAL PROLAPSED HEMORRHOIDS: ICD-10-CM

## 2023-01-06 DIAGNOSIS — N99.3 VAGINAL VAULT PROLAPSE, POSTHYSTERECTOMY: ICD-10-CM

## 2023-01-06 PROCEDURE — 1159F MED LIST DOCD IN RCRD: CPT | Mod: CPTII,S$GLB,, | Performed by: SURGERY

## 2023-01-06 PROCEDURE — 3079F DIAST BP 80-89 MM HG: CPT | Mod: CPTII,S$GLB,, | Performed by: SURGERY

## 2023-01-06 PROCEDURE — 99999 PR PBB SHADOW E&M-EST. PATIENT-LVL III: CPT | Mod: PBBFAC,,, | Performed by: SURGERY

## 2023-01-06 PROCEDURE — 3008F PR BODY MASS INDEX (BMI) DOCUMENTED: ICD-10-PCS | Mod: CPTII,S$GLB,, | Performed by: SURGERY

## 2023-01-06 PROCEDURE — 99999 PR PBB SHADOW E&M-EST. PATIENT-LVL III: ICD-10-PCS | Mod: PBBFAC,,, | Performed by: SURGERY

## 2023-01-06 PROCEDURE — 3008F BODY MASS INDEX DOCD: CPT | Mod: CPTII,S$GLB,, | Performed by: SURGERY

## 2023-01-06 PROCEDURE — 99213 PR OFFICE/OUTPT VISIT, EST, LEVL III, 20-29 MIN: ICD-10-PCS | Mod: S$GLB,,, | Performed by: SURGERY

## 2023-01-06 PROCEDURE — 3079F PR MOST RECENT DIASTOLIC BLOOD PRESSURE 80-89 MM HG: ICD-10-PCS | Mod: CPTII,S$GLB,, | Performed by: SURGERY

## 2023-01-06 PROCEDURE — 99213 OFFICE O/P EST LOW 20 MIN: CPT | Mod: S$GLB,,, | Performed by: SURGERY

## 2023-01-06 PROCEDURE — 3074F PR MOST RECENT SYSTOLIC BLOOD PRESSURE < 130 MM HG: ICD-10-PCS | Mod: CPTII,S$GLB,, | Performed by: SURGERY

## 2023-01-06 PROCEDURE — 3074F SYST BP LT 130 MM HG: CPT | Mod: CPTII,S$GLB,, | Performed by: SURGERY

## 2023-01-06 PROCEDURE — 1159F PR MEDICATION LIST DOCUMENTED IN MEDICAL RECORD: ICD-10-PCS | Mod: CPTII,S$GLB,, | Performed by: SURGERY

## 2023-03-06 ENCOUNTER — TELEPHONE (OUTPATIENT)
Dept: UROGYNECOLOGY | Facility: CLINIC | Age: 58
End: 2023-03-06
Payer: COMMERCIAL

## 2023-03-06 NOTE — TELEPHONE ENCOUNTER
----- Message from Juan Richmond sent at 3/6/2023  1:53 PM CST -----  Contact: [phone]  the patient is calling to get rescheduled for there appt.  the patient would like a call back at your earliest convenience.  the patient can be reached at.   [phone]

## 2023-03-06 NOTE — TELEPHONE ENCOUNTER
Returned patient call. Patient states she hurt her back and is having surgery on her back on 3/20 so she needs to reschedule her surgery with Dr. Barton and Dr. Zamora. Informed patient that I will let Dr. Barton know and we will reschedule her. Informed patient that she will still need to see her PCP again for clearance prior to surgery. Patient verbalized understanding, no questions at this time.

## 2023-06-12 ENCOUNTER — TELEPHONE (OUTPATIENT)
Dept: SURGERY | Facility: CLINIC | Age: 58
End: 2023-06-12
Payer: COMMERCIAL

## 2023-06-12 DIAGNOSIS — N95.2 VAGINAL ATROPHY: ICD-10-CM

## 2023-06-12 RX ORDER — ESTRADIOL 0.1 MG/G
CREAM VAGINAL
Qty: 42.5 G | Refills: 11 | Status: SHIPPED | OUTPATIENT
Start: 2023-06-12

## 2023-06-12 NOTE — TELEPHONE ENCOUNTER
Spoke with pt regarding request to r/s procedure. Informed pt that we will reach out to Dr. Barton's office to coordinate a clinical appt on Monday to see both providers. Pt verbalized understanding. Denies questions.       ----- Message from Radha Gr sent at 6/12/2023  1:48 PM CDT -----  Contact: @[phone]  Pt is calling in to reschedule her procedure that was canceled due to the pt having back surgery. Please call to discuss further.

## 2023-06-30 ENCOUNTER — TELEPHONE (OUTPATIENT)
Dept: UROLOGY | Facility: CLINIC | Age: 58
End: 2023-06-30
Payer: COMMERCIAL

## 2023-06-30 NOTE — TELEPHONE ENCOUNTER
Patient reports that she noticed an increase in vaginal bulge last night- resolved now. Reviewed instructions from previous visits. No earlier appointments available.    ----- Message from Es De Souza sent at 6/30/2023  8:25 AM CDT -----  Charla Sharif calling regarding Patient Advice for wanting to inform the doctor that her bladder has dropped even more last night and wanted to see what can be done or if she is needing to be seen sooner than 08/14/23, call back to inform [phone]

## 2023-08-11 ENCOUNTER — TELEPHONE (OUTPATIENT)
Dept: SURGERY | Facility: CLINIC | Age: 58
End: 2023-08-11
Payer: COMMERCIAL

## 2023-08-13 NOTE — PROGRESS NOTES
Urogyn follow up  08/14/2023    [facility] - UROGYNECOLOGY  [address] [address]    Charla Sharif  [id number]  1965      Charla Sharif is a 58 y.o. here for a urogyn follow up. The patient's last visit with me was on 12/23/2022 (telemed).       1)  UI:  (+) BECKY > (+) UUI  X 1 year.  (+) pads (for fecal issues, no urine on pads).  Daytime frequency: Q 1 hours.  Nocturia: Yes: 2/night.   (+) dysuria,  (--) hematuria,  (--) frequent UTIs.  (--) complete bladder emptying. +PV dribbling. DV with variable 2nd amount.     2)  POP:  Present x 1-2 months. To introitus.  Symptoms:(+)  pressure.  (--) vaginal bleeding. (--) vaginal discharge. (+) sexually active.  (--) dyspareunia.  (+)  Vaginal dryness. (+) vaginal estrogen use--uses 0.5 3x/week--not consistent.     3)  BM:  (+) constipation/straining x several months since rectal prolapse. Was not constipated before that. Taking linzess-not sure is helping. Has BM Q4 days.   (--) chronic diarrhea. (--) hematochezia.  + fecal incontinence (2x/week).  (--) fecal smearing/urgency.  (--) complete evacuation.  NO splinting.   --2 PPD for fecal smearing   --mucus discharge+  --rectal prolapse x 2 months  --8/2022 dynamic MRI:  Impression:  1.  Anatomic findings: Postop change of hysterectomy.  2.  Functional evaluation: Normal anorectal junction location and widened levator hiatus width at rest with mild/Grade 1 (2-4 cm) descent and mild/Grade 1 (6-8 cm) widening during defecation.  3.  Anterior compartment findings: Grade 1 cystocele.  4.  Middle compartment findings: Normal.  5.  Posterior compartment findings: Grade 1 peritoneocele and moderate rectocele.    --initial exam:  PELVIC:    External genitalia:  Normal Bartholins, Skenes and labia bilaterally.    Urethra:  No caruncle, diverticulum or masses.  (+) hypermobility.    Vagina:  Atrophy (+) , no bladder masses or tender, no discharge.  +TTP LV B.  Cervix:  absent  Uterus: uterus absent  Adnexa:  Not palpable  POP-Q:  Aa 0; Ba 0; C -8; Ap 0; Bp 0.  Genital hiatus 4, perineal body 2, total vaginal length 10.    Past Medical History  Past Medical History:   Diagnosis Date    ADD (attention deficit disorder)     Anxiety    HTN  Sciatica: takes gabapentin + mobic daily     Past Surgical History  Past Surgical History:   Procedure Laterality Date    CHOLECYSTECTOMY      HYSTERECTOMY     --discectomy (?lumbar) 3/2023  --LSC esther     Hysterectomy: Yes   Date: .  Indication: emergent for ovarian cyst rupture.    Type: xlap/Pfannenstiel  Cervix present: No  Ovaries present: No  Other procedures at time of hysterectomy:  none    Past Ob History     x 2.  C/s x 0.    Largest infant weight: 9#  yes FAVD. yes episiotomy.      Gynecologic History  LMP: No LMP recorded.  Age of menarche: 8th grade  Age of menopause: with hyst  Menstrual history: h/o menorrhagia  Pap test: post IVA.  History of abnormal paps: No.  History of STIs:  No  Mammogram: Date of last:  ([facility]).  Result: Normal per report.  Colonoscopy: Date of last:  ([facility]).  Result:  normal per report.  Repeat due:  per CRS/PCP.    DEXA:  none    Issues include:  Patient Active Problem List   Diagnosis    Urinary incontinence, mixed    Rectal prolapse    Vaginal vault prolapse, posthysterectomy    Myalgia of pelvic floor    Straining with stools    Constipation    Vaginal atrophy    Cystocele, midline    Rectocele, female    Internal prolapsed hemorrhoids       History since last visit:   1) Rectal prolapse:  --still feeling some tissue bulging at anal area, marlyn with more activities and straining with BMs   --still feels something coming out--worries about rectal prolapse  --now having some FI and mucus discharge  --saw Dr. Zamora ([facility]) 2023:  - No evidence of full thickness rectal prolapse  - We discussed risks, benefits and alternatives of excisional hemorrhoidectomy.  We discussed postoperative pain expectations, risk of  bleeding, and infection.  Consent obtained    2022 MR perez:  Impression:  1.  Anatomic findings: Postop change of hysterectomy.  2.  Functional evaluation: Normal anorectal junction location and widened levator hiatus width at rest with mild/Grade 1 (2-4 cm) descent and mild/Grade 1 (6-8 cm) widening during defecation.  3.  Anterior compartment findings: Grade 1 cystocele.  4.  Middle compartment findings: Normal.  5.  Posterior compartment findings: Grade 1 peritoneocele and moderate rectocele.    2)  Vaginal atrophy (dryness):  using vaginal estrogen 2x/week    3)  Mixed urinary incontinence, urge < stress:    --still having AMOS, may be worse; 1 PPD, min wetness  --never did UDS    4)  constipation/straining with stools:  --taking amitiza 24 mcg daily +high fiber diet + 1 fiber pill/day + miralax PRN (not weekly)   --controlled; denies significant straining  --not taking narcotics  --takes xanaflex for back pain; has some residual numbness--takes gabapentin  --certain foods trigger  --did not start PT--had back surgery + father     5)  Stage 2 cystocele/rectocele, stage 1 vaginal vault prolapse:  --feels worse than last visit    6)  Fecal incontinence:  --new since last visit with [facility]  --says goes through a few diapers a day    Medications:    Current Outpatient Medications:     amLODIPine (NORVASC) 5 MG tablet, Take 5 mg by mouth once daily., Disp: , Rfl:     ARIPiprazole (ABILIFY) 2 MG Tab, Take 2 mg by mouth., Disp: , Rfl:     ARIPiprazole (ABILIFY) 2 MG Tab, Take 2 mg by mouth., Disp: , Rfl:     atomoxetine (STRATTERA) 80 MG capsule, Take 80 mg by mouth once daily., Disp: , Rfl:     atomoxetine (STRATTERA) 80 MG capsule, 80 mg., Disp: , Rfl:     busPIRone (BUSPAR) 15 MG tablet, TAKE 1/2 TABLET BY MOUTH 3 TIMES DAILY, Disp: , Rfl:     busPIRone (BUSPAR) 15 MG tablet,  TAKE 1/2 TABLET BY MOUTH 3 TIMES DAILY, Disp: , Rfl:     clonazePAM (KLONOPIN) 0.5 MG tablet, Take 0.5 mg by mouth 2 (two) times daily as  needed for Anxiety., Disp: , Rfl:     enalapril (VASOTEC) 20 MG tablet, Take 20 mg by mouth once daily., Disp: , Rfl:     enalapril (VASOTEC) 20 MG tablet, Take 1 tablet by mouth 2 (two) times daily., Disp: , Rfl:     EScitalopram oxalate (LEXAPRO) 20 MG tablet, Take 20 mg by mouth once daily., Disp: , Rfl:     estradioL (ESTRACE) 0.01 % (0.1 mg/gram) vaginal cream, 0.5 grams with applicator or dime-sized amount with finger in vagina nightly x 2 weeks, then twice a week thereafter, Disp: 42.5 g, Rfl: 11    gabapentin (NEURONTIN) 300 MG capsule, TAKE 1 CAPSULE BY MOUTH 3 TIMES DAILY, Disp: , Rfl:     gabapentin (NEURONTIN) 300 MG capsule, Take 300 mg by mouth., Disp: , Rfl:     lubiprostone (AMITIZA) 24 MCG Cap, Take by mouth., Disp: , Rfl:     lubiprostone (AMITIZA) 24 MCG Cap, Take 24 mcg by mouth., Disp: , Rfl:     tiZANidine (ZANAFLEX) 4 MG tablet, Take 4 mg by mouth every 8 (eight) hours as needed., Disp: , Rfl:     amLODIPine (NORVASC) 5 MG tablet, , Disp: , Rfl:     bisacodyL (DULCOLAX) 5 mg EC tablet, Take 5 mg by mouth., Disp: , Rfl:     busPIRone (BUSPAR) 7.5 MG tablet, Take 7.5 mg by mouth 3 (three) times daily., Disp: , Rfl:     busPIRone (BUSPAR) 7.5 MG tablet, Take 7.5 mg by mouth., Disp: , Rfl:     clonazePAM (KLONOPIN) 0.5 MG tablet, , Disp: , Rfl:     colestipoL (COLESTID) 1 gram Tab, Take 1 g by mouth., Disp: , Rfl:     cyclobenzaprine (FLEXERIL) 10 MG tablet, Take 10 mg by mouth., Disp: , Rfl:     dexlansoprazole (DEXILANT) 60 mg capsule, Take 60 mg by mouth once daily., Disp: , Rfl:     dexlansoprazole (DEXILANT) 60 mg capsule, Take 60 mg by mouth., Disp: , Rfl:     escitalopram oxalate (LEXAPRO ORAL), , Disp: , Rfl:     HYDROcodone-acetaminophen (NORCO)  mg per tablet, Take 1 tablet by mouth every 4 (four) hours as needed., Disp: , Rfl:     HYDROcodone-acetaminophen (NORCO) 7.5-325 mg per tablet, Take 1 tablet by mouth., Disp: , Rfl:     linaCLOtide (LINZESS) 290 mcg Cap capsule, Take 290  mcg by mouth before breakfast., Disp: , Rfl:     meloxicam (MOBIC) 15 MG tablet, Take 15 mg by mouth every morning., Disp: , Rfl:     methocarbamoL (ROBAXIN) 500 MG Tab, Take 500-1,000 mg by mouth every 8 (eight) hours as needed., Disp: , Rfl:     mupirocin (BACTROBAN) 2 % ointment, 2 (two) times daily., Disp: , Rfl:     oxyCODONE-acetaminophen (PERCOCET) 5-325 mg per tablet, Take 1 tablet by mouth every 6 (six) hours as needed., Disp: , Rfl:     TRULANCE 3 mg Tab, Take 1 tablet by mouth., Disp: , Rfl:     ROS:  As per HPI.      Exam  /82 (BP Location: Right arm, Patient Position: Sitting, BP Method: Small (Automatic))   Wt 59.4 kg (130 lb 15.3 oz)   BMI 23.57 kg/m²   General: alert and oriented, no acute distress  Abd: soft, NT, ND  INC: Pfannenstiel and LSC well-healed    PELVIC:    External genitalia:  Normal Bartholins, Skenes and labia bilaterally.    Urethra:  No caruncle, diverticulum or masses.  (+) hypermobility.    Vagina:  Atrophy (+) , no bladder masses or tender, no discharge.  +TTP LV B.  Cervix:  absent  Uterus: uterus absent  Adnexa: Not palpable  POP-Q:  Aa 0; Ba 0; C -5; Ap 0; Bp 0.  Genital hiatus 4, perineal body 2, total vaginal length 10 (standing).    Rectal:   --external: +hemorrhoids, non-thrombosed; no obvious rectal prolapse on photo  --internal: deferred to CRS    Impression  1. Urinary incontinence, mixed        2. Vaginal vault prolapse, posthysterectomy        3. Cystocele, midline        4. Rectocele, female        5. Internal prolapsed hemorrhoids        6. Myalgia of pelvic floor        7. Straining with stools  Ambulatory referral/consult to Physical/Occupational Therapy      8. Vaginal atrophy        9. Incontinence of feces, unspecified fecal incontinence type  Ambulatory referral/consult to Physical/Occupational Therapy          We reviewed the above issues and discussed options for short-term versus long-term management of her problems.   Plan:   1) Rectal prolapse vs  hemorrhoids:  --management per Dr. Zamora in CRS   --MR perez 2022 NEG   --has not been able to reproduce in office or per photo--none today on exam   --hemorrhoids+: Dr. Zamora was planning removal at time of any needed UG surgery  --will work to minimize straining with BMs (see below)    2)  Vaginal atrophy (dryness):  Use 0.5 gram of estrogen cream in vagina at night twice a week.   --will help general discomfort and drynes    3)  Mixed urinary incontinence, urge < stress:    --Empty bladder every 3 hours.  Empty well: wait a minute, lean forward on toilet.    --Avoid dietary irritants (see sheet).  Keep diary x 3-5 days to determine your irritants.  --start pelvic floor PT  --URGE: consider medication in future. Takes 2-4 weeks to see if will have effect.  For dry mouth: get sour, sugar free lozenge or gum.    --STRESS:  Pessary vs. Sling.    --UDS vs cystometry preop to see if needs concomitant MUS    4)  constipation/straining with stools:  --continue taking amitiza 24 mcg daily +high fiber diet + 1 fiber pill/day (may need to increase to 2-3 pills/day) + miralax PRN (not weekly)  --hydrate well  --start fiber supplement  --would start pelvic floor PT to work on minimizing straining with BMs; also needs plan to optimize constipation control (taking 290 mcg linzess with ? Improvement)--may be impacted by rectal prolapse    --call to make appt:  Eula Jurado  [facility]  [address], [address]  [place], [place]  (p) [phone]      5)  Stage 2 cystocele/rectocele, stage 1 vaginal vault prolapse:  --discussed  --options: observation vs pessary/PT (won't get rid of bulge but can help pressure) vs surgery   --if surgery: robotic/laparoscopic sacral colpopexy vs uterosacral suspension (would lean toward ASC due to rectal prolapse)   --if surgery: discuss plan with Dr. Zamora  --no rectal prolapse on today's exam (would she do ventral mesh rectopexy or suture rectopexy?)  --hemorrhoids+:  would plan concomitant removal   --if surgery: needs UDS/cysto vs office cystometry preop to determine need for concomitant MUS for BECKY   --if surgery: needs clearance per PCP + labs (CBC, CMP, T&S)/EKG   --if surgery: would start pelvic floor PT to work on minimizing straining with BMs; also needs plan to optimize constipation control (taking 290 mcg linzess with ? Improvement)--may be impacted by rectal prolapse; please call to make appt  Eula Jurado  [facility]  [address]  [place], [place]  (p) [phone]    6)  Vapes occasionally:  --have to stop at least 1 month before surgery; then no vaping x 3 months (at least postop)   --cannot fix leakage at same time if vaping because sling doesn't heal with nicotine use    7)  Fecal incontinence:  --new symptom  --management per CRS  --to keep diary    8)  Will await Dr. Zamora's thoughts about GI and need to treat 1st vs UG surgery 1st.    20 minutes were spent in face to face time with this patient  100 % of this time was spent in counseling and/or coordination of care     Carrol Barton MD  [facility]  Division of Female Pelvic Medicine and Reconstructive Surgery  Department of Obstetrics & Gynecology

## 2023-08-14 ENCOUNTER — OFFICE VISIT (OUTPATIENT)
Dept: SURGERY | Facility: CLINIC | Age: 58
End: 2023-08-14
Payer: COMMERCIAL

## 2023-08-14 ENCOUNTER — OFFICE VISIT (OUTPATIENT)
Dept: UROGYNECOLOGY | Facility: CLINIC | Age: 58
End: 2023-08-14
Payer: COMMERCIAL

## 2023-08-14 VITALS
WEIGHT: 130.94 LBS | BODY MASS INDEX: 23.57 KG/M2 | DIASTOLIC BLOOD PRESSURE: 82 MMHG | SYSTOLIC BLOOD PRESSURE: 138 MMHG

## 2023-08-14 DIAGNOSIS — R19.8 STRAINING WITH STOOLS: ICD-10-CM

## 2023-08-14 DIAGNOSIS — R15.9 INCONTINENCE OF FECES, UNSPECIFIED FECAL INCONTINENCE TYPE: ICD-10-CM

## 2023-08-14 DIAGNOSIS — K64.8 INTERNAL PROLAPSED HEMORRHOIDS: ICD-10-CM

## 2023-08-14 DIAGNOSIS — N81.6 RECTOCELE, FEMALE: ICD-10-CM

## 2023-08-14 DIAGNOSIS — K64.8 INTERNAL PROLAPSED HEMORRHOIDS: Primary | ICD-10-CM

## 2023-08-14 DIAGNOSIS — N39.46 URINARY INCONTINENCE, MIXED: Primary | ICD-10-CM

## 2023-08-14 DIAGNOSIS — M79.18 MYALGIA OF PELVIC FLOOR: ICD-10-CM

## 2023-08-14 DIAGNOSIS — N95.2 VAGINAL ATROPHY: ICD-10-CM

## 2023-08-14 DIAGNOSIS — N99.3 VAGINAL VAULT PROLAPSE, POSTHYSTERECTOMY: ICD-10-CM

## 2023-08-14 DIAGNOSIS — N81.11 CYSTOCELE, MIDLINE: ICD-10-CM

## 2023-08-14 PROCEDURE — 1159F PR MEDICATION LIST DOCUMENTED IN MEDICAL RECORD: ICD-10-PCS | Mod: CPTII,S$GLB,, | Performed by: OBSTETRICS & GYNECOLOGY

## 2023-08-14 PROCEDURE — 3008F BODY MASS INDEX DOCD: CPT | Mod: CPTII,S$GLB,, | Performed by: OBSTETRICS & GYNECOLOGY

## 2023-08-14 PROCEDURE — 3079F DIAST BP 80-89 MM HG: CPT | Mod: CPTII,S$GLB,, | Performed by: OBSTETRICS & GYNECOLOGY

## 2023-08-14 PROCEDURE — 1160F RVW MEDS BY RX/DR IN RCRD: CPT | Mod: CPTII,S$GLB,, | Performed by: OBSTETRICS & GYNECOLOGY

## 2023-08-14 PROCEDURE — 3079F PR MOST RECENT DIASTOLIC BLOOD PRESSURE 80-89 MM HG: ICD-10-PCS | Mod: CPTII,S$GLB,, | Performed by: OBSTETRICS & GYNECOLOGY

## 2023-08-14 PROCEDURE — 4010F PR ACE/ARB THEARPY RXD/TAKEN: ICD-10-PCS | Mod: CPTII,S$GLB,, | Performed by: SURGERY

## 2023-08-14 PROCEDURE — 4010F PR ACE/ARB THEARPY RXD/TAKEN: ICD-10-PCS | Mod: CPTII,S$GLB,, | Performed by: OBSTETRICS & GYNECOLOGY

## 2023-08-14 PROCEDURE — 99214 PR OFFICE/OUTPT VISIT, EST, LEVL IV, 30-39 MIN: ICD-10-PCS | Mod: S$GLB,,, | Performed by: SURGERY

## 2023-08-14 PROCEDURE — 99999 PR PBB SHADOW E&M-EST. PATIENT-LVL I: ICD-10-PCS | Mod: PBBFAC,,, | Performed by: SURGERY

## 2023-08-14 PROCEDURE — 1160F PR REVIEW ALL MEDS BY PRESCRIBER/CLIN PHARMACIST DOCUMENTED: ICD-10-PCS | Mod: CPTII,S$GLB,, | Performed by: OBSTETRICS & GYNECOLOGY

## 2023-08-14 PROCEDURE — 4010F ACE/ARB THERAPY RXD/TAKEN: CPT | Mod: CPTII,S$GLB,, | Performed by: SURGERY

## 2023-08-14 PROCEDURE — 3008F PR BODY MASS INDEX (BMI) DOCUMENTED: ICD-10-PCS | Mod: CPTII,S$GLB,, | Performed by: OBSTETRICS & GYNECOLOGY

## 2023-08-14 PROCEDURE — 99999 PR PBB SHADOW E&M-EST. PATIENT-LVL V: ICD-10-PCS | Mod: PBBFAC,,, | Performed by: OBSTETRICS & GYNECOLOGY

## 2023-08-14 PROCEDURE — 3075F SYST BP GE 130 - 139MM HG: CPT | Mod: CPTII,S$GLB,, | Performed by: OBSTETRICS & GYNECOLOGY

## 2023-08-14 PROCEDURE — 99999 PR PBB SHADOW E&M-EST. PATIENT-LVL V: CPT | Mod: PBBFAC,,, | Performed by: OBSTETRICS & GYNECOLOGY

## 2023-08-14 PROCEDURE — 4010F ACE/ARB THERAPY RXD/TAKEN: CPT | Mod: CPTII,S$GLB,, | Performed by: OBSTETRICS & GYNECOLOGY

## 2023-08-14 PROCEDURE — 1159F MED LIST DOCD IN RCRD: CPT | Mod: CPTII,S$GLB,, | Performed by: OBSTETRICS & GYNECOLOGY

## 2023-08-14 PROCEDURE — 99213 PR OFFICE/OUTPT VISIT, EST, LEVL III, 20-29 MIN: ICD-10-PCS | Mod: S$GLB,,, | Performed by: OBSTETRICS & GYNECOLOGY

## 2023-08-14 PROCEDURE — 3075F PR MOST RECENT SYSTOLIC BLOOD PRESS GE 130-139MM HG: ICD-10-PCS | Mod: CPTII,S$GLB,, | Performed by: OBSTETRICS & GYNECOLOGY

## 2023-08-14 PROCEDURE — 99213 OFFICE O/P EST LOW 20 MIN: CPT | Mod: S$GLB,,, | Performed by: OBSTETRICS & GYNECOLOGY

## 2023-08-14 PROCEDURE — 99999 PR PBB SHADOW E&M-EST. PATIENT-LVL I: CPT | Mod: PBBFAC,,, | Performed by: SURGERY

## 2023-08-14 PROCEDURE — 99214 OFFICE O/P EST MOD 30 MIN: CPT | Mod: S$GLB,,, | Performed by: SURGERY

## 2023-08-14 RX ORDER — BUSPIRONE HYDROCHLORIDE 7.5 MG/1
7.5 TABLET ORAL 3 TIMES DAILY
COMMUNITY
Start: 2023-06-13

## 2023-08-14 RX ORDER — ENALAPRIL MALEATE 20 MG/1
1 TABLET ORAL 2 TIMES DAILY
COMMUNITY
Start: 2023-07-18

## 2023-08-14 RX ORDER — MONTELUKAST SODIUM 4 MG/1
1 TABLET, CHEWABLE ORAL
Status: ON HOLD | COMMUNITY
End: 2023-10-18 | Stop reason: HOSPADM

## 2023-08-14 RX ORDER — TIZANIDINE 4 MG/1
4 TABLET ORAL EVERY 8 HOURS PRN
COMMUNITY
Start: 2023-07-25

## 2023-08-14 RX ORDER — AMLODIPINE BESYLATE 5 MG/1
TABLET ORAL
COMMUNITY
End: 2023-10-02 | Stop reason: SDUPTHER

## 2023-08-14 RX ORDER — DEXLANSOPRAZOLE 60 MG/1
60 CAPSULE, DELAYED RELEASE ORAL
Status: ON HOLD | COMMUNITY
Start: 2022-01-06 | End: 2023-10-18 | Stop reason: HOSPADM

## 2023-08-14 RX ORDER — ARIPIPRAZOLE 2 MG/1
2 TABLET ORAL
COMMUNITY
Start: 2023-06-13 | End: 2023-10-02 | Stop reason: SDUPTHER

## 2023-08-14 RX ORDER — HYDROCODONE BITARTRATE AND ACETAMINOPHEN 7.5; 325 MG/1; MG/1
1 TABLET ORAL
Status: ON HOLD | COMMUNITY
Start: 2023-03-14 | End: 2023-10-04 | Stop reason: HOSPADM

## 2023-08-14 RX ORDER — ATOMOXETINE 80 MG/1
80 CAPSULE ORAL
COMMUNITY
Start: 2022-07-28 | End: 2023-10-02 | Stop reason: SDUPTHER

## 2023-08-14 RX ORDER — CLONAZEPAM 0.5 MG/1
TABLET ORAL
COMMUNITY
End: 2024-03-01

## 2023-08-14 RX ORDER — ARIPIPRAZOLE 2 MG/1
2 TABLET ORAL DAILY
COMMUNITY
Start: 2023-08-08

## 2023-08-14 RX ORDER — CYCLOBENZAPRINE HCL 10 MG
10 TABLET ORAL
Status: ON HOLD | COMMUNITY
Start: 2023-03-29 | End: 2023-10-04 | Stop reason: HOSPADM

## 2023-08-14 RX ORDER — METHOCARBAMOL 500 MG/1
500-1000 TABLET, FILM COATED ORAL EVERY 8 HOURS PRN
Status: ON HOLD | COMMUNITY
Start: 2023-02-06 | End: 2023-10-04 | Stop reason: HOSPADM

## 2023-08-14 RX ORDER — BUSPIRONE HYDROCHLORIDE 15 MG/1
TABLET ORAL
COMMUNITY
Start: 2022-07-28 | End: 2024-03-01

## 2023-08-14 RX ORDER — BISACODYL 5 MG
5 TABLET, DELAYED RELEASE (ENTERIC COATED) ORAL DAILY PRN
COMMUNITY
Start: 2023-02-06

## 2023-08-14 RX ORDER — HYDROCODONE BITARTRATE AND ACETAMINOPHEN 10; 325 MG/1; MG/1
1 TABLET ORAL EVERY 4 HOURS PRN
Status: ON HOLD | COMMUNITY
Start: 2023-03-20 | End: 2023-10-04 | Stop reason: HOSPADM

## 2023-08-14 RX ORDER — OXYCODONE AND ACETAMINOPHEN 5; 325 MG/1; MG/1
1 TABLET ORAL EVERY 6 HOURS PRN
Status: ON HOLD | COMMUNITY
Start: 2023-02-06 | End: 2023-10-04 | Stop reason: HOSPADM

## 2023-08-14 RX ORDER — MUPIROCIN 20 MG/G
OINTMENT TOPICAL 2 TIMES DAILY
Status: ON HOLD | COMMUNITY
Start: 2023-03-14 | End: 2023-10-04 | Stop reason: HOSPADM

## 2023-08-14 RX ORDER — PLECANATIDE 3 MG/1
1 TABLET ORAL
Status: ON HOLD | COMMUNITY
Start: 2023-06-09 | End: 2023-10-04 | Stop reason: HOSPADM

## 2023-08-14 RX ORDER — LUBIPROSTONE 24 UG/1
24 CAPSULE ORAL
Status: ON HOLD | COMMUNITY
Start: 2023-06-13 | End: 2023-10-04 | Stop reason: HOSPADM

## 2023-08-14 RX ORDER — GABAPENTIN 300 MG/1
300 CAPSULE ORAL
Status: ON HOLD | COMMUNITY
Start: 2023-03-30 | End: 2023-10-04 | Stop reason: HOSPADM

## 2023-08-14 RX ORDER — LUBIPROSTONE 24 UG/1
CAPSULE ORAL 2 TIMES DAILY WITH MEALS
COMMUNITY
Start: 2023-08-04

## 2023-08-14 RX ORDER — BUSPIRONE HYDROCHLORIDE 7.5 MG/1
7.5 TABLET ORAL
COMMUNITY
Start: 2023-06-13 | End: 2024-03-01

## 2023-08-14 NOTE — PATIENT INSTRUCTIONS
1) Rectal prolapse vs hemorrhoids:  --management per Dr. Zamora in CRS   --MR chris 2022 NEG   --has not been able to reproduce in office or per photo--none today on exam   --hemorrhoids+: Dr. Zamora was planning removal at time of any needed UG surgery  --will work to minimize straining with BMs (see below)    2)  Vaginal atrophy (dryness):  Use 0.5 gram of estrogen cream in vagina at night twice a week.   --will help general discomfort and drynes    3)  Mixed urinary incontinence, urge < stress:    --Empty bladder every 3 hours.  Empty well: wait a minute, lean forward on toilet.    --Avoid dietary irritants (see sheet).  Keep diary x 3-5 days to determine your irritants.  --start pelvic floor PT  --URGE: consider medication in future. Takes 2-4 weeks to see if will have effect.  For dry mouth: get sour, sugar free lozenge or gum.    --STRESS:  Pessary vs. Sling.    --UDS vs cystometry preop to see if needs concomitant MUS    4)  constipation/straining with stools:  --continue taking amitiza 24 mcg daily +high fiber diet + 1 fiber pill/day (may need to increase to 2-3 pills/day) + miralax PRN (not weekly)  --hydrate well  --start fiber supplement  --would start pelvic floor PT to work on minimizing straining with BMs; also needs plan to optimize constipation control (taking 290 mcg linzess with ? Improvement)--may be impacted by rectal prolapse    --call to make appt:  Eula Jurado  [facility]  [address]  [place], [place]  (p) [phone]      5)  Stage 2 cystocele/rectocele, stage 1 vaginal vault prolapse:  --discussed  --options: observation vs pessary/PT (won't get rid of bulge but can help pressure) vs surgery   --if surgery: robotic/laparoscopic sacral colpopexy vs uterosacral suspension (would lean toward ASC due to rectal prolapse)   --if surgery: discuss plan with Dr. Zamora  --no rectal prolapse on today's exam (would she do ventral mesh rectopexy or suture  rectopexy?)  --hemorrhoids+: would plan concomitant removal   --if surgery: needs UDS/cysto vs office cystometry preop to determine need for concomitant MUS for BECKY   --if surgery: needs clearance per PCP + labs (CBC, CMP, T&S)/EKG   --if surgery: would start pelvic floor PT to work on minimizing straining with BMs; also needs plan to optimize constipation control (taking 290 mcg linzess with ? Improvement)--may be impacted by rectal prolapse; please call to make appt  Eula Jurado  [facility]  [address]  [place], [place]  (p) [phone]    6)  Vapes occasionally:  --have to stop at least 1 month before surgery; then no vaping x 3 months (at least postop)   --cannot fix leakage at same time if vaping because sling doesn't heal with nicotine use    7)  Will discuss with Dr. Zamora and make plan for concomitant surgery.

## 2023-08-17 NOTE — PROGRESS NOTES
Colon & Rectal Surgery Clinic Follow Up    HPI:   Charla Sharif is a 58 y.o. female who presents for follow up of prolapsing hemorrhoids.  Surgical intervention with Dr. Barton delayed due to back surgery (lumbar).  Reports that hemorrhoid symptoms are worse, continues to have prolapse.  Reports fecal incontinence a few times per weeks.  Wearing diapers when she leaves the house.  Incontinence to liquid, gas and solids.  Taking amitiza for constipation. Has not started PFPT yet.        Objective:   Vitals from Dr. Barton's office reviewed     Physical Exam   Gen: well developed female, NAD  HEENT: normocephalic, atraumatic, PERRL, EOMI   CV: RRR, no murmurs  Resp: nonlabored, CTAB   Abd: soft, NTND   MSK: no gross deformities, no cyanosis or edema   Anorectal: + three column mixed hemorrhoids with prolapse with valsalva    Assessment and Plan:   Charla Sharif  is a 58 y.o. female who presents for follow up of hemorrhoids and fecal incontinence     - start PFPT, referral placed by Dr. Barton  - keep bowel diary, sample pages provided in clinic  - Patient's most concerning symptom at this time is her fecal incontinence.  We briefly discussed sacral nerve stimulation if symptoms are refractory to PFPT.  She has recently had spine surgery, incision confined to L spine.   - No evidence of rectal prolapse, patient does have grade III internal hemorrhoids (see media).  If patient proceeds with surgery with Dr. Barton, will plan for concurrent hemorrhoidectomy.  Would not recommend SNS in combination with any other procedures.   - follow up in 4-6 weeks after initiation of PFPT   - plan of care coordinated with Dr. Oralia Zamora MD  Staff Surgeon   Colon & Rectal Surgery

## 2023-09-22 ENCOUNTER — OFFICE VISIT (OUTPATIENT)
Dept: SURGERY | Facility: CLINIC | Age: 58
End: 2023-09-22
Payer: COMMERCIAL

## 2023-09-22 VITALS
OXYGEN SATURATION: 95 % | DIASTOLIC BLOOD PRESSURE: 80 MMHG | HEIGHT: 63 IN | BODY MASS INDEX: 23.75 KG/M2 | HEART RATE: 85 BPM | RESPIRATION RATE: 19 BRPM | WEIGHT: 134.06 LBS | SYSTOLIC BLOOD PRESSURE: 125 MMHG

## 2023-09-22 DIAGNOSIS — R15.9 FULL INCONTINENCE OF FECES: Primary | ICD-10-CM

## 2023-09-22 PROCEDURE — 3079F DIAST BP 80-89 MM HG: CPT | Mod: CPTII,S$GLB,, | Performed by: SURGERY

## 2023-09-22 PROCEDURE — 4010F PR ACE/ARB THEARPY RXD/TAKEN: ICD-10-PCS | Mod: CPTII,S$GLB,, | Performed by: SURGERY

## 2023-09-22 PROCEDURE — 99213 OFFICE O/P EST LOW 20 MIN: CPT | Mod: S$GLB,,, | Performed by: SURGERY

## 2023-09-22 PROCEDURE — 1159F MED LIST DOCD IN RCRD: CPT | Mod: CPTII,S$GLB,, | Performed by: SURGERY

## 2023-09-22 PROCEDURE — 1159F PR MEDICATION LIST DOCUMENTED IN MEDICAL RECORD: ICD-10-PCS | Mod: CPTII,S$GLB,, | Performed by: SURGERY

## 2023-09-22 PROCEDURE — 99999 PR PBB SHADOW E&M-EST. PATIENT-LVL V: ICD-10-PCS | Mod: PBBFAC,,, | Performed by: SURGERY

## 2023-09-22 PROCEDURE — 3074F SYST BP LT 130 MM HG: CPT | Mod: CPTII,S$GLB,, | Performed by: SURGERY

## 2023-09-22 PROCEDURE — 3079F PR MOST RECENT DIASTOLIC BLOOD PRESSURE 80-89 MM HG: ICD-10-PCS | Mod: CPTII,S$GLB,, | Performed by: SURGERY

## 2023-09-22 PROCEDURE — 3074F PR MOST RECENT SYSTOLIC BLOOD PRESSURE < 130 MM HG: ICD-10-PCS | Mod: CPTII,S$GLB,, | Performed by: SURGERY

## 2023-09-22 PROCEDURE — 99213 PR OFFICE/OUTPT VISIT, EST, LEVL III, 20-29 MIN: ICD-10-PCS | Mod: S$GLB,,, | Performed by: SURGERY

## 2023-09-22 PROCEDURE — 3008F BODY MASS INDEX DOCD: CPT | Mod: CPTII,S$GLB,, | Performed by: SURGERY

## 2023-09-22 PROCEDURE — 99999 PR PBB SHADOW E&M-EST. PATIENT-LVL V: CPT | Mod: PBBFAC,,, | Performed by: SURGERY

## 2023-09-22 PROCEDURE — 3008F PR BODY MASS INDEX (BMI) DOCUMENTED: ICD-10-PCS | Mod: CPTII,S$GLB,, | Performed by: SURGERY

## 2023-09-22 PROCEDURE — 4010F ACE/ARB THERAPY RXD/TAKEN: CPT | Mod: CPTII,S$GLB,, | Performed by: SURGERY

## 2023-09-22 NOTE — PROGRESS NOTES
Colon & Rectal Surgery Clinic Follow Up    HPI:   Charla Sharif is a 58 y.o. female who presents for follow up of hemorrhoids and fecal incontinence.  Has been having daily episodes of incontinence.  Bowel diary obtained in clinic.  She reports ongoing severe embarrassment and alterations to her life with incontinence.  Has tried lifestyle modifications including fiber and pelvic floor PT without success.      [facility] incontinence score:   Incontinence to solid stool - 4  Incontinence to liquid stool - 4  Incontinence to gas - 4  Wears pad - 4  Lifestyle alterations -4      Objective:   Vitals:    09/22/23 1057   BP: 125/80   Pulse: 85   Resp: 19        Physical Exam   Gen: well developed female, NAD  HEENT: normocephalic, atraumatic, PERRL, EOMI   CV: RRR, no murmurs  Resp: nonlabored, CTAB   Abd: soft, NTND   MSK: no gross deformities, no cyanosis or edema  Anorectal: internal/external hemorrhoids, FLACO with intact rectal tone, mild increase with squeeze     Assessment and Plan:   Charla Sharif  is a 58 y.o. female who presents for follow up of fecal incontinence, hemorrhoids and pelvic organ prolapse    - We reviewed patient's bowel diary.  Now having daily episodes of fecal incontinence and wearing depends daily.  Has started PFPT since her last visit without significant improvement.  She is anxious to proceed with SNS due to embarrassment with her current incontinence  - we discussed the process of SNS.  We reviewed that this is a two stage procedure.  If patient has significant improvement (decrease in incontinence episodes by > 50%), we will proceed with implantation of a permanent generator 10-14 days later.  If no significant improvement, will explant leads.  We reviewed risk of surgery, the most significant of which is infection.  We also discussed risk of inability to place leads, lead displacement and need for adjustment.  Consent signed  - proceed to OR 10/4.       Adry Zamora MD  Staff Surgeon    Colon & Rectal Surgery

## 2023-09-22 NOTE — H&P (VIEW-ONLY)
Colon & Rectal Surgery Clinic Follow Up    HPI:   Charla Sharif is a 58 y.o. female who presents for follow up of hemorrhoids and fecal incontinence.  Has been having daily episodes of incontinence.  Bowel diary obtained in clinic.  She reports ongoing severe embarrassment and alterations to her life with incontinence.  Has tried lifestyle modifications including fiber and pelvic floor PT without success.      [facility] incontinence score:   Incontinence to solid stool - 4  Incontinence to liquid stool - 4  Incontinence to gas - 4  Wears pad - 4  Lifestyle alterations -4      Objective:   Vitals:    09/22/23 1057   BP: 125/80   Pulse: 85   Resp: 19        Physical Exam   Gen: well developed female, NAD  HEENT: normocephalic, atraumatic, PERRL, EOMI   CV: RRR, no murmurs  Resp: nonlabored, CTAB   Abd: soft, NTND   MSK: no gross deformities, no cyanosis or edema  Anorectal: internal/external hemorrhoids, FLACO with intact rectal tone, mild increase with squeeze     Assessment and Plan:   Charla Sharif  is a 58 y.o. female who presents for follow up of fecal incontinence, hemorrhoids and pelvic organ prolapse    - We reviewed patient's bowel diary.  Now having daily episodes of fecal incontinence and wearing depends daily.  Has started PFPT since her last visit without significant improvement.  She is anxious to proceed with SNS due to embarrassment with her current incontinence  - we discussed the process of SNS.  We reviewed that this is a two stage procedure.  If patient has significant improvement (decrease in incontinence episodes by > 50%), we will proceed with implantation of a permanent generator 10-14 days later.  If no significant improvement, will explant leads.  We reviewed risk of surgery, the most significant of which is infection.  We also discussed risk of inability to place leads, lead displacement and need for adjustment.  Consent signed  - proceed to OR 10/4.       Adry Zamora MD  Staff Surgeon    Colon & Rectal Surgery

## 2023-09-22 NOTE — H&P (VIEW-ONLY)
Colon & Rectal Surgery Clinic Follow Up    HPI:   Charla Sharif is a 58 y.o. female who presents for follow up of hemorrhoids and fecal incontinence.  Has been having daily episodes of incontinence.  Bowel diary obtained in clinic.  She reports ongoing severe embarrassment and alterations to her life with incontinence.  Has tried lifestyle modifications including fiber and pelvic floor PT without success.      [facility] incontinence score:   Incontinence to solid stool - 4  Incontinence to liquid stool - 4  Incontinence to gas - 4  Wears pad - 4  Lifestyle alterations -4      Objective:   Vitals:    09/22/23 1057   BP: 125/80   Pulse: 85   Resp: 19        Physical Exam   Gen: well developed female, NAD  HEENT: normocephalic, atraumatic, PERRL, EOMI   CV: RRR, no murmurs  Resp: nonlabored, CTAB   Abd: soft, NTND   MSK: no gross deformities, no cyanosis or edema  Anorectal: internal/external hemorrhoids, FLACO with intact rectal tone, mild increase with squeeze     Assessment and Plan:   Charla Sharif  is a 58 y.o. female who presents for follow up of fecal incontinence, hemorrhoids and pelvic organ prolapse    - We reviewed patient's bowel diary.  Now having daily episodes of fecal incontinence and wearing depends daily.  Has started PFPT since her last visit without significant improvement.  She is anxious to proceed with SNS due to embarrassment with her current incontinence  - we discussed the process of SNS.  We reviewed that this is a two stage procedure.  If patient has significant improvement (decrease in incontinence episodes by > 50%), we will proceed with implantation of a permanent generator 10-14 days later.  If no significant improvement, will explant leads.  We reviewed risk of surgery, the most significant of which is infection.  We also discussed risk of inability to place leads, lead displacement and need for adjustment.  Consent signed  - proceed to OR 10/4.       Adry Zamora MD  Staff Surgeon    Colon & Rectal Surgery

## 2023-09-25 ENCOUNTER — ANESTHESIA EVENT (OUTPATIENT)
Dept: SURGERY | Facility: OTHER | Age: 58
End: 2023-09-25
Payer: COMMERCIAL

## 2023-10-02 ENCOUNTER — TELEPHONE (OUTPATIENT)
Dept: SURGERY | Facility: CLINIC | Age: 58
End: 2023-10-02
Payer: COMMERCIAL

## 2023-10-02 NOTE — TELEPHONE ENCOUNTER
Attempted to contact pt regarding arrival time for procedure on 10/4. No answer and voicemail not set up. Message sent via portal.         ----- Message from Daisy Melendez sent at 10/2/2023  1:12 PM CDT -----  Regarding: Procedure Time  Contact: Pt @[phone]  Pt is calling to speak to someone in the office to, discuss procedure arrival time. Pt states that they have not heard from anyone in the office. Please call to advise. Thanks.         Call Back or Sent message thru the [facility]?[phone]              Additional Information:   Pt did not hear from Provider at 12pm noon

## 2023-10-02 NOTE — PRE-PROCEDURE INSTRUCTIONS
Pre admit phone call completed.    Instructions given to patient about NPO status as follows:     The evening before surgery do not eat anything after 9 p.m. ( this includes hard candy, chewing gum and mints).  You may only have GATORADE, POWERADE AND WATER from 9 p.m. until you leave your home. DO NOT  DRINK ANY LIQUIDS ON THE WAY TO THE HOSPITAL.      Patient was also instructed on the below information:    Park in the Parking lot behind the hospital or in the [employer] Parking Garage across the street from the parking lot.  Parking is complimentary.  If you will be discharged the same day as your procedure, please arrange for a responsible adult to drive you home or  to accompany you if traveling by taxi.  YOU WILL NOT BE PERMITTED TO DRIVE OR TO LEAVE THE HOSPITAL ALONE AFTER SURGERY.  It is strongly recommended that you arrange for someone to remain with you for the first 24 hrs following your surgery.    Patient verbalized understanding of above instructions.

## 2023-10-03 ENCOUNTER — TELEPHONE (OUTPATIENT)
Dept: SURGERY | Facility: CLINIC | Age: 58
End: 2023-10-03
Payer: COMMERCIAL

## 2023-10-03 NOTE — TELEPHONE ENCOUNTER
Spoke with pt regarding 1000 arrival time. Review NPO at 9 PM and skin prep for surgery. Pt denies questions. Pt verbally confirms time and location.      ----- Message from Ju Cr sent at 10/3/2023  2:26 PM CDT -----  Regarding: appt access  Contact: pt [phone]  Pt calling to verify time to arrive for procedure. Pls call

## 2023-10-04 ENCOUNTER — HOSPITAL ENCOUNTER (OUTPATIENT)
Facility: OTHER | Age: 58
Discharge: HOME | End: 2023-10-04
Attending: SURGERY | Admitting: SURGERY
Payer: COMMERCIAL

## 2023-10-04 ENCOUNTER — ANESTHESIA (OUTPATIENT)
Dept: SURGERY | Facility: OTHER | Age: 58
End: 2023-10-04
Payer: COMMERCIAL

## 2023-10-04 DIAGNOSIS — Z01.818 PRE-OP TESTING: Primary | ICD-10-CM

## 2023-10-04 DIAGNOSIS — R15.9 FECAL INCONTINENCE: ICD-10-CM

## 2023-10-04 LAB
ANION GAP SERPL CALC-SCNC: 8 MMOL/L (ref 8–16)
BASOPHILS # BLD AUTO: 0.03 K/UL (ref 0–0.2)
BASOPHILS NFR BLD: 0.4 % (ref 0–1.9)
BUN SERPL-MCNC: 15 MG/DL (ref 6–20)
CALCIUM SERPL-MCNC: 9.1 MG/DL (ref 8.7–10.5)
CHLORIDE SERPL-SCNC: 105 MMOL/L (ref 95–110)
CO2 SERPL-SCNC: 26 MMOL/L (ref 23–29)
CREAT SERPL-MCNC: 0.7 MG/DL (ref 0.5–1.4)
DIFFERENTIAL METHOD: ABNORMAL
EOSINOPHIL # BLD AUTO: 0.2 K/UL (ref 0–0.5)
EOSINOPHIL NFR BLD: 2.7 % (ref 0–8)
ERYTHROCYTE [DISTWIDTH] IN BLOOD BY AUTOMATED COUNT: 11.1 % (ref 11.5–14.5)
EST. GFR  (NO RACE VARIABLE): >60 ML/MIN/1.73 M^2
GLUCOSE SERPL-MCNC: 129 MG/DL (ref 70–110)
HCT VFR BLD AUTO: 40.5 % (ref 37–48.5)
HGB BLD-MCNC: 13.5 G/DL (ref 12–16)
IMM GRANULOCYTES # BLD AUTO: 0.01 K/UL (ref 0–0.04)
IMM GRANULOCYTES NFR BLD AUTO: 0.1 % (ref 0–0.5)
LYMPHOCYTES # BLD AUTO: 2.1 K/UL (ref 1–4.8)
LYMPHOCYTES NFR BLD: 28.8 % (ref 18–48)
MCH RBC QN AUTO: 33.3 PG (ref 27–31)
MCHC RBC AUTO-ENTMCNC: 33.3 G/DL (ref 32–36)
MCV RBC AUTO: 100 FL (ref 82–98)
MONOCYTES # BLD AUTO: 0.6 K/UL (ref 0.3–1)
MONOCYTES NFR BLD: 8.1 % (ref 4–15)
NEUTROPHILS # BLD AUTO: 4.5 K/UL (ref 1.8–7.7)
NEUTROPHILS NFR BLD: 59.9 % (ref 38–73)
NRBC BLD-RTO: 0 /100 WBC
PLATELET # BLD AUTO: 243 K/UL (ref 150–450)
PMV BLD AUTO: 9.7 FL (ref 9.2–12.9)
POTASSIUM SERPL-SCNC: 4.2 MMOL/L (ref 3.5–5.1)
RBC # BLD AUTO: 4.05 M/UL (ref 4–5.4)
SODIUM SERPL-SCNC: 139 MMOL/L (ref 136–145)
WBC # BLD AUTO: 7.43 K/UL (ref 3.9–12.7)

## 2023-10-04 PROCEDURE — 37000008 HC ANESTHESIA 1ST 15 MINUTES: Performed by: SURGERY

## 2023-10-04 PROCEDURE — 63600175 PHARM REV CODE 636 W HCPCS: Performed by: SURGERY

## 2023-10-04 PROCEDURE — 36000707: Performed by: SURGERY

## 2023-10-04 PROCEDURE — D9220A PRA ANESTHESIA: Mod: CRNA,,, | Performed by: NURSE ANESTHETIST, CERTIFIED REGISTERED

## 2023-10-04 PROCEDURE — 25000003 PHARM REV CODE 250: Performed by: ANESTHESIOLOGY

## 2023-10-04 PROCEDURE — 71000015 HC POSTOP RECOV 1ST HR: Performed by: SURGERY

## 2023-10-04 PROCEDURE — 71000016 HC POSTOP RECOV ADDL HR: Performed by: SURGERY

## 2023-10-04 PROCEDURE — D9220A PRA ANESTHESIA: ICD-10-PCS | Mod: CRNA,,, | Performed by: NURSE ANESTHETIST, CERTIFIED REGISTERED

## 2023-10-04 PROCEDURE — 71000039 HC RECOVERY, EACH ADD'L HOUR: Performed by: SURGERY

## 2023-10-04 PROCEDURE — C1778 LEAD, NEUROSTIMULATOR: HCPCS | Performed by: SURGERY

## 2023-10-04 PROCEDURE — 25000003 PHARM REV CODE 250: Performed by: NURSE PRACTITIONER

## 2023-10-04 PROCEDURE — 63600175 PHARM REV CODE 636 W HCPCS

## 2023-10-04 PROCEDURE — 25000003 PHARM REV CODE 250: Performed by: SURGERY

## 2023-10-04 PROCEDURE — 85025 COMPLETE CBC W/AUTO DIFF WBC: CPT | Performed by: SURGERY

## 2023-10-04 PROCEDURE — 63600175 PHARM REV CODE 636 W HCPCS: Performed by: NURSE ANESTHETIST, CERTIFIED REGISTERED

## 2023-10-04 PROCEDURE — 64561 PR PERCUT IMPLANT,NEUROELEC,SACRAL NERVE: ICD-10-PCS | Mod: LT,,, | Performed by: SURGERY

## 2023-10-04 PROCEDURE — 63600175 PHARM REV CODE 636 W HCPCS: Performed by: ANESTHESIOLOGY

## 2023-10-04 PROCEDURE — 36415 COLL VENOUS BLD VENIPUNCTURE: CPT | Performed by: SURGERY

## 2023-10-04 PROCEDURE — 37000009 HC ANESTHESIA EA ADD 15 MINS: Performed by: SURGERY

## 2023-10-04 PROCEDURE — 36000706: Performed by: SURGERY

## 2023-10-04 PROCEDURE — 64561 IMPLANT NEUROELECTRODES: CPT | Mod: LT,,, | Performed by: SURGERY

## 2023-10-04 PROCEDURE — 71000033 HC RECOVERY, INTIAL HOUR: Performed by: SURGERY

## 2023-10-04 PROCEDURE — 27201423 OPTIME MED/SURG SUP & DEVICES STERILE SUPPLY: Performed by: SURGERY

## 2023-10-04 PROCEDURE — C1883 ADAPT/EXT, PACING/NEURO LEAD: HCPCS | Performed by: SURGERY

## 2023-10-04 PROCEDURE — D9220A PRA ANESTHESIA: Mod: ANES,,, | Performed by: ANESTHESIOLOGY

## 2023-10-04 PROCEDURE — 80048 BASIC METABOLIC PNL TOTAL CA: CPT | Performed by: SURGERY

## 2023-10-04 PROCEDURE — D9220A PRA ANESTHESIA: ICD-10-PCS | Mod: ANES,,, | Performed by: ANESTHESIOLOGY

## 2023-10-04 PROCEDURE — C1787 PATIENT PROGR, NEUROSTIM: HCPCS | Performed by: SURGERY

## 2023-10-04 PROCEDURE — 25000003 PHARM REV CODE 250

## 2023-10-04 DEVICE — LEAD INTERSTIM 2 SURESCAN 28CM: Type: IMPLANTABLE DEVICE | Site: BACK | Status: FUNCTIONAL

## 2023-10-04 RX ORDER — FENTANYL CITRATE 50 UG/ML
INJECTION, SOLUTION INTRAMUSCULAR; INTRAVENOUS
Status: DISCONTINUED | OUTPATIENT
Start: 2023-10-04 | End: 2023-10-04

## 2023-10-04 RX ORDER — ONDANSETRON 2 MG/ML
INJECTION INTRAMUSCULAR; INTRAVENOUS
Status: DISCONTINUED | OUTPATIENT
Start: 2023-10-04 | End: 2023-10-04

## 2023-10-04 RX ORDER — PROCHLORPERAZINE EDISYLATE 5 MG/ML
5 INJECTION INTRAMUSCULAR; INTRAVENOUS EVERY 30 MIN PRN
Status: DISCONTINUED | OUTPATIENT
Start: 2023-10-04 | End: 2023-10-04 | Stop reason: HOSPADM

## 2023-10-04 RX ORDER — KETOROLAC TROMETHAMINE 30 MG/ML
INJECTION, SOLUTION INTRAMUSCULAR; INTRAVENOUS
Status: DISCONTINUED | OUTPATIENT
Start: 2023-10-04 | End: 2023-10-04

## 2023-10-04 RX ORDER — SUCCINYLCHOLINE CHLORIDE 20 MG/ML
INJECTION INTRAMUSCULAR; INTRAVENOUS
Status: DISCONTINUED | OUTPATIENT
Start: 2023-10-04 | End: 2023-10-04

## 2023-10-04 RX ORDER — SODIUM CHLORIDE, SODIUM LACTATE, POTASSIUM CHLORIDE, CALCIUM CHLORIDE 600; 310; 30; 20 MG/100ML; MG/100ML; MG/100ML; MG/100ML
INJECTION, SOLUTION INTRAVENOUS CONTINUOUS
Status: DISCONTINUED | OUTPATIENT
Start: 2023-10-04 | End: 2023-10-04 | Stop reason: HOSPADM

## 2023-10-04 RX ORDER — MEPERIDINE HYDROCHLORIDE 25 MG/ML
12.5 INJECTION INTRAMUSCULAR; INTRAVENOUS; SUBCUTANEOUS ONCE AS NEEDED
Status: DISCONTINUED | OUTPATIENT
Start: 2023-10-04 | End: 2023-10-04 | Stop reason: HOSPADM

## 2023-10-04 RX ORDER — SODIUM CHLORIDE 9 MG/ML
INJECTION, SOLUTION INTRAVENOUS CONTINUOUS
Status: ACTIVE | OUTPATIENT
Start: 2023-10-04

## 2023-10-04 RX ORDER — SODIUM CHLORIDE 0.9 % (FLUSH) 0.9 %
3 SYRINGE (ML) INJECTION
Status: DISCONTINUED | OUTPATIENT
Start: 2023-10-04 | End: 2023-10-04 | Stop reason: HOSPADM

## 2023-10-04 RX ORDER — DEXAMETHASONE SODIUM PHOSPHATE 4 MG/ML
INJECTION, SOLUTION INTRA-ARTICULAR; INTRALESIONAL; INTRAMUSCULAR; INTRAVENOUS; SOFT TISSUE
Status: DISCONTINUED | OUTPATIENT
Start: 2023-10-04 | End: 2023-10-04

## 2023-10-04 RX ORDER — MUPIROCIN 20 MG/G
OINTMENT TOPICAL
Status: DISPENSED | OUTPATIENT
Start: 2023-10-04

## 2023-10-04 RX ORDER — PHENYLEPHRINE HYDROCHLORIDE 10 MG/ML
INJECTION INTRAVENOUS
Status: DISCONTINUED | OUTPATIENT
Start: 2023-10-04 | End: 2023-10-04

## 2023-10-04 RX ORDER — LIDOCAINE HYDROCHLORIDE 20 MG/ML
INJECTION INTRAVENOUS
Status: DISCONTINUED | OUTPATIENT
Start: 2023-10-04 | End: 2023-10-04

## 2023-10-04 RX ORDER — BUPIVACAINE HYDROCHLORIDE 2.5 MG/ML
INJECTION, SOLUTION EPIDURAL; INFILTRATION; INTRACAUDAL
Status: DISCONTINUED | OUTPATIENT
Start: 2023-10-04 | End: 2023-10-04 | Stop reason: HOSPADM

## 2023-10-04 RX ORDER — OXYCODONE HYDROCHLORIDE 5 MG/1
5 TABLET ORAL EVERY 4 HOURS PRN
Status: DISCONTINUED | OUTPATIENT
Start: 2023-10-04 | End: 2023-10-04 | Stop reason: HOSPADM

## 2023-10-04 RX ORDER — PROPOFOL 10 MG/ML
VIAL (ML) INTRAVENOUS
Status: DISCONTINUED | OUTPATIENT
Start: 2023-10-04 | End: 2023-10-04

## 2023-10-04 RX ORDER — LIDOCAINE HYDROCHLORIDE 10 MG/ML
1 INJECTION, SOLUTION EPIDURAL; INFILTRATION; INTRACAUDAL; PERINEURAL ONCE
Status: ACTIVE | OUTPATIENT
Start: 2023-10-04

## 2023-10-04 RX ORDER — ROCURONIUM BROMIDE 10 MG/ML
INJECTION, SOLUTION INTRAVENOUS
Status: DISCONTINUED | OUTPATIENT
Start: 2023-10-04 | End: 2023-10-04

## 2023-10-04 RX ORDER — HYDROMORPHONE HYDROCHLORIDE 2 MG/ML
0.4 INJECTION, SOLUTION INTRAMUSCULAR; INTRAVENOUS; SUBCUTANEOUS EVERY 5 MIN PRN
Status: DISCONTINUED | OUTPATIENT
Start: 2023-10-04 | End: 2023-10-04 | Stop reason: HOSPADM

## 2023-10-04 RX ORDER — DIPHENHYDRAMINE HYDROCHLORIDE 50 MG/ML
12.5 INJECTION INTRAMUSCULAR; INTRAVENOUS EVERY 30 MIN PRN
Status: DISCONTINUED | OUTPATIENT
Start: 2023-10-04 | End: 2023-10-04 | Stop reason: HOSPADM

## 2023-10-04 RX ADMIN — DEXAMETHASONE SODIUM PHOSPHATE 8 MG: 4 INJECTION, SOLUTION INTRAMUSCULAR; INTRAVENOUS at 12:10

## 2023-10-04 RX ADMIN — FENTANYL CITRATE 100 MCG: 50 INJECTION, SOLUTION INTRAMUSCULAR; INTRAVENOUS at 11:10

## 2023-10-04 RX ADMIN — KETOROLAC TROMETHAMINE 30 MG: 30 INJECTION, SOLUTION INTRAMUSCULAR; INTRAVENOUS at 12:10

## 2023-10-04 RX ADMIN — MUPIROCIN: 20 OINTMENT TOPICAL at 10:10

## 2023-10-04 RX ADMIN — PROPOFOL 150 MG: 10 INJECTION, EMULSION INTRAVENOUS at 12:10

## 2023-10-04 RX ADMIN — LIDOCAINE HYDROCHLORIDE 50 MG: 20 INJECTION, SOLUTION INTRAVENOUS at 11:10

## 2023-10-04 RX ADMIN — OXYCODONE HYDROCHLORIDE 5 MG: 5 TABLET ORAL at 01:10

## 2023-10-04 RX ADMIN — HYDROMORPHONE HYDROCHLORIDE 0.4 MG: 2 INJECTION INTRAMUSCULAR; INTRAVENOUS; SUBCUTANEOUS at 01:10

## 2023-10-04 RX ADMIN — SODIUM CHLORIDE, SODIUM LACTATE, POTASSIUM CHLORIDE, AND CALCIUM CHLORIDE: .6; .31; .03; .02 INJECTION, SOLUTION INTRAVENOUS at 11:10

## 2023-10-04 RX ADMIN — PHENYLEPHRINE HYDROCHLORIDE 100 MCG: 10 INJECTION INTRAVENOUS at 12:10

## 2023-10-04 RX ADMIN — GLYCOPYRROLATE 0.2 MG: 0.2 INJECTION, SOLUTION INTRAMUSCULAR; INTRAVITREAL at 12:10

## 2023-10-04 RX ADMIN — VANCOMYCIN HYDROCHLORIDE 1000 MG: 1 INJECTION, POWDER, LYOPHILIZED, FOR SOLUTION INTRAVENOUS at 10:10

## 2023-10-04 RX ADMIN — ROCURONIUM BROMIDE 10 MG: 10 SOLUTION INTRAVENOUS at 12:10

## 2023-10-04 RX ADMIN — ONDANSETRON HYDROCHLORIDE 4 MG: 2 INJECTION INTRAMUSCULAR; INTRAVENOUS at 12:10

## 2023-10-04 RX ADMIN — GENTAMICIN SULFATE 275 MG: 40 INJECTION, SOLUTION INTRAMUSCULAR; INTRAVENOUS at 12:10

## 2023-10-04 RX ADMIN — SUCCINYLCHOLINE CHLORIDE 120 MG: 20 INJECTION, SOLUTION INTRAMUSCULAR; INTRAVENOUS at 12:10

## 2023-10-04 RX ADMIN — PHENYLEPHRINE HYDROCHLORIDE 200 MCG: 10 INJECTION INTRAVENOUS at 12:10

## 2023-10-04 RX ADMIN — SODIUM CHLORIDE, POTASSIUM CHLORIDE, SODIUM LACTATE AND CALCIUM CHLORIDE: 600; 310; 30; 20 INJECTION, SOLUTION INTRAVENOUS at 10:10

## 2023-10-04 NOTE — OR NURSING
VSS on RA. Pt states pain is tolerable. Dressings intact with dried drainage and PIV C/D/I. Meets Phase I discharge criteria. Ready for transfer to Phase II. Family notified.

## 2023-10-04 NOTE — DISCHARGE INSTRUCTIONS
DISCHARGE INSTRUCTIONS:    - resume regular diet and activities  - continue bowel diary   - call [phone] for worsening pain, fever > 101, redness or drainage from incision   - take tylenol and ibuprofen for pain   - follow instructions provided by [employer] regarding device settins      Adry Zamora MD  Staff Surgeon   Colon & Rectal Surgery

## 2023-10-04 NOTE — INTERVAL H&P NOTE
The patient has been examined and the H&P has been reviewed:    I concur with the findings and no changes have occurred since H&P was written.    Surgery risks, benefits and alternative options discussed and understood by patient/family.      Adry Zamora MD  Staff Surgeon   Colon & Rectal Surgery

## 2023-10-04 NOTE — ANESTHESIA PROCEDURE NOTES
Intubation    Date/Time: 10/4/2023 12:01 PM    Performed by: Dao Greene CRNA  Authorized by: Dao Greene CRNA    Intubation:     Induction:  Intravenous    Intubated:  Postinduction    Mask Ventilation:  Easy mask    Attempts:  1    Attempted By:  CRNA    Method of Intubation:  Video laryngoscopy    Blade:  Banerjee 3    Laryngeal View Grade: Grade I - full view of cords      Difficult Airway Encountered?: No      Complications:  None    Airway Device:  Oral endotracheal tube    Airway Device Size:  7.0    Style/Cuff Inflation:  Cuffed (inflated to minimal occlusive pressure)    Tube secured:  21    Secured at:  The lips    Placement Verified By:  Capnometry    Complicating Factors:  None    Findings Post-Intubation:  BS equal bilateral and atraumatic/condition of teeth unchanged

## 2023-10-04 NOTE — OP NOTE
"  Operative Note    Date: 10/04/2023    Name: Charla Sharif    MRN: [id number]    Pre-Op Diagnosis: Full Incontinence of feces    Post-Op Diagnosis: Same    Procedure(s) Performed:   Tined Lead Placement/Stage 1 Interstim (80520)  Fluoro report reading (60167-11)    Specimen(s): None    Staff Surgeon: Adry Zamora    Assistant Surgeon: None    Anesthesia: Monitor Anesthesia Care    Indications: Chrala Sharif is a 58 y.o. female with full fecal incontinence refractory to medical management.  We discussed the risks, benefits and alternatives of sacral nerve stimulation and the patient wished to proceed.  Consent was obtained.      Details of procedure:     The patient was taken to the operating room and transferred to the OR table in the prone position. A pillow was placed under the lower abdomen to flatten the sacrum.  The toes were allowed to dangle freely, and socks were removed. They were then placed under sedation by the anesthesia team.  The patient was prepped and draped in the usual sterile fashion using Ioban.      The C-arm was moved into the AP position to provide fluoroscopic guidance of the sacrum.  The medial edges of the foramina were identified and marked.  The C-arm was then moved into the lateral position to identify the S3 foramen.  Once the needle point was determined, local injection of anesthetic was performed.  A 3.5" foramen needle was placed into the superior, medial aspect of the S3 foramen.  Appropriate needle depth was confirmed using fluoroscopy.  Proper S3 needle location was also confirmed by direct observation of bellowing of the perineum.       The foramen needle stylet was removed and the directional guide was placed through the needle using markers on the guide.  The foramen needle was removed by sliding it over this guide.  A small incision was made on the skin, and the lead introducer with dilator was placed over the directional guide.  This was introduced until the " radiopaque marker was retirement through the foramen using fluoroscopic guidance.  The dilator was then removed along with the directional guide.  Using fluoroscopy, the tined lead with bent stylet was placed through the introducer until the electrodes 2 and 3 straddled the anterior surface of the sacrum.  All 4 electrodes were tested, and confirmed to produce bellowing.  After satisfactory lead positioning was confirmed, the introducer was removed over the lead under continuous fluoroscopy, deploying the tines into the presacral tissue.  All 4 electrodes were retested confirming an appropriate response, and final images were saved.     The future internal neurostimulator pocket site was identified below the iliac crest and lateral to the sacrum on the left side.  Local was administered and an incision was made into the subcutaneous tissue creating a connection site.  Blunt dissection was used to create a small pocket, and hemostasis was achieved with cautery. A tunneling tool with sheath was placed from the lead exit site subcutaneously to a small incised pocket connection site.  The sheath was removed. The lead was cleaned and dried. The lead was inserted into the temporary percutaneous extension with visual confirmation of blue tip placement.  The screw was tightened until audible clicks were heard.      Using the tunneling tool and sheath, subcutaneous tunnel was created from the pocket site to the contralateral side and exited at a localized site. The percutaneous extension was placed through the sheath, and the sheath was removed.     The connection components were placed into the small pocket.  The incisions and pockets were irrigated with sterile water and closed with multiple layers of Vicryl suture, followed by running subcuticular Monocryl. Skin glue was placed over the incisions the percutaneous extension site was secured with a Biopatch and Tegaderm. The twist lock cable was then plugged into the ENS and  placed into the pouch on the where bbl patient built.  All counts were correct.  I was present and scrubbed for the entire procedure. The patient was transferred to the operating room in good condition.     Using the  and ENS, the patient was programmed to the electrode of optimum sensation and provided utilization instructions prior to discharge.  They will complete a bowel and bladder diary during the test.  To help document the results of this procedure.    Estimated Blood Loss: 10 cc    Drains/Implants:   Implant Name Type Inv. Item Serial No.  Lot No. LRB No. Used Action   LEAD INTERSTIM 2 SURESCAN 28CM - [id number]  LEAD INTERSTIM 2 SURESCAN 28CM  [employer] [facility] [id number] N/A 1 Implanted       Wound Class: I (clean)    Adry Zamora MD  Staff Surgeon   Colon & Rectal Surgery

## 2023-10-04 NOTE — TRANSFER OF CARE
"Anesthesia Transfer of Care Note    Patient: Charla Sharif    Procedure(s) Performed: Procedure(s) (LRB):  INSERTION, NEUROSTIMULATOR, TEMPORARY, SACRAL (N/A)    Patient location: PACU    Anesthesia Type: general    Transport from OR: Transported from OR on 2-3 L/min O2 by NC with adequate spontaneous ventilation    Post pain: adequate analgesia    Post assessment: no apparent anesthetic complications    Post vital signs: stable    Level of consciousness: awake    Nausea/Vomiting: no nausea/vomiting    Complications: none    Transfer of care protocol was followed      Last vitals:   Visit Vitals  /69 (BP Location: Right arm, Patient Position: Lying)   Pulse 69   Temp 36.9 °C (98.4 °F) (Oral)   Resp 16   Ht 5' 3" (1.6 m)   Wt 59 kg (130 lb)   SpO2 100%   Breastfeeding No   BMI 23.03 kg/m²     "

## 2023-10-04 NOTE — ANESTHESIA POSTPROCEDURE EVALUATION
Anesthesia Post Evaluation    Patient: Charla Sharif    Procedure(s) Performed: Procedure(s) (LRB):  INSERTION, NEUROSTIMULATOR, TEMPORARY, SACRAL (N/A)    Final Anesthesia Type: general      Patient location during evaluation: PACU  Patient participation: Yes- Able to Participate  Level of consciousness: awake and alert  Post-procedure vital signs: reviewed and stable  Pain management: adequate  Airway patency: patent    PONV status at discharge: No PONV  Anesthetic complications: no      Cardiovascular status: blood pressure returned to baseline  Respiratory status: spontaneous ventilation  Hydration status: euvolemic  Follow-up not needed.          Vitals Value Taken Time   /72 10/04/23 1448   Temp 36.8 °C (98.2 °F) 10/04/23 1418   Pulse 68 10/04/23 1448   Resp 16 10/04/23 1448   SpO2 97 % 10/04/23 1448         Event Time   Out of Recovery 14:14:00         Pain/Fede Score: Pain Rating Prior to Med Admin: 7 (10/4/2023  1:49 PM)  Pain Rating Post Med Admin: 3 (10/4/2023  2:00 PM)  Fede Score: 9 (10/4/2023  1:30 PM)

## 2023-10-04 NOTE — PLAN OF CARE
Charla Julieta Chante has met all discharge criteria from Phase II. Vital Signs are stable, ambulating  without difficulty. Discharge instructions given, patient verbalized understanding. Discharged from facility via wheelchair in stable condition.

## 2023-10-04 NOTE — DISCHARGE SUMMARY
[facility] ([place]  Discharge Note  Short Stay    Procedure(s) (LRB):  INSERTION, NEUROSTIMULATOR, TEMPORARY, SACRAL (N/A)      OUTCOME: Patient tolerated treatment/procedure well without complication and is now ready for discharge.    DISPOSITION: Home or Self Care    FINAL DIAGNOSIS:  full fecal incontinence    FOLLOWUP: In clinic    DISCHARGE INSTRUCTIONS:    - resume regular diet and activities  - continue bowel diary   - call [phone] for worsening pain, fever > 101, redness or drainage from incision   - take tylenol and ibuprofen for pain   - follow instructions provided by [employer] regarding device settins     Adry Zamora MD  Staff Surgeon   Colon & Rectal Surgery

## 2023-10-04 NOTE — ANESTHESIA PREPROCEDURE EVALUATION
10/04/2023  Charla Sharif is a 58 y.o., female.      Pre-op Assessment    I have reviewed the Patient Summary Reports.     I have reviewed the Nursing Notes. I have reviewed the NPO Status.   I have reviewed the Medications.     Review of Systems  Anesthesia Hx:  No problems with previous Anesthesia  Denies Family Hx of Anesthesia complications.   Denies Personal Hx of Anesthesia complications.   Social:  Former Smoker, No Alcohol Use    Hematology/Oncology:  Hematology Normal   Oncology Normal     Cardiovascular:  Cardiovascular Normal     Pulmonary:  Pulmonary Normal    Renal/:  Renal/ Normal     Hepatic/GI:  Hepatic/GI Normal    Neurological:  Neurology Normal    Endocrine:  Endocrine Normal        Physical Exam  General: Well nourished and Cooperative    Airway:  Mallampati: I   Mouth Opening: Normal  TM Distance: Normal  Neck ROM: Normal ROM    Dental:  Intact, Braces        Anesthesia Plan  Type of Anesthesia, risks & benefits discussed:    Anesthesia Type: Gen ETT  Intra-op Monitoring Plan: Standard ASA Monitors  Post Op Pain Control Plan: multimodal analgesia  Induction:  IV  Airway Plan: Video  Informed Consent: Informed consent signed with the Patient and all parties understand the risks and agree with anesthesia plan.  All questions answered.   ASA Score: 1  Day of Surgery Review of History & Physical: H&P Update referred to the surgeon/provider.    Ready For Surgery From Anesthesia Perspective.     .

## 2023-10-05 ENCOUNTER — TELEPHONE (OUTPATIENT)
Dept: SURGERY | Facility: CLINIC | Age: 58
End: 2023-10-05
Payer: COMMERCIAL

## 2023-10-05 VITALS
SYSTOLIC BLOOD PRESSURE: 116 MMHG | HEIGHT: 63 IN | DIASTOLIC BLOOD PRESSURE: 72 MMHG | HEART RATE: 68 BPM | WEIGHT: 130 LBS | BODY MASS INDEX: 23.04 KG/M2 | OXYGEN SATURATION: 97 % | TEMPERATURE: 98 F | RESPIRATION RATE: 16 BRPM

## 2023-10-05 NOTE — TELEPHONE ENCOUNTER
Spoke with pt regarding bandage changes. Informed pt that she should continue to keep the area clean and dry. Allow soapy water to run over the area, pat dry, and cover. Advised to change as needed. Monitor for frequency, color, and smell of drainage. Informed that she will be contacted tomorrow with follow up appt and further instructions. Pt denies further questions at this time.         ----- Message from Elysia Frey sent at 10/5/2023 10:41 AM CDT -----  Type:  Needs Medical Advice     Who Called: PATTIE VENEGAS [[id number]]    Would the patient rather a call back or a response via [facility]? Call back     Best Call Back Number: [phone]    Additional Information: Patient had a procedure yesterday would like instructions on when to change her bandages

## 2023-10-09 ENCOUNTER — TELEPHONE (OUTPATIENT)
Dept: SURGERY | Facility: CLINIC | Age: 58
End: 2023-10-09
Payer: COMMERCIAL

## 2023-10-09 DIAGNOSIS — R15.9 FULL INCONTINENCE OF FECES: Primary | ICD-10-CM

## 2023-10-09 NOTE — TELEPHONE ENCOUNTER
Called patient to follow up on results.  Has adjusted settings. Will continue to monitor progress. Will plan for stage II vs. Removal on 10/18

## 2023-10-12 ENCOUNTER — ANESTHESIA EVENT (OUTPATIENT)
Dept: SURGERY | Facility: OTHER | Age: 58
End: 2023-10-12
Payer: COMMERCIAL

## 2023-10-12 NOTE — PRE-PROCEDURE INSTRUCTIONS
Pre admit phone call completed.    Instructions given to patient about NPO status as follows:     The evening before surgery do not eat anything after 9 p.m. ( this includes hard candy, chewing gum and mints).  You may only have GATORADE, POWERADE AND WATER from 9 p.m. until you leave your home. DO NOT  DRINK ANY LIQUIDS ON THE WAY TO THE HOSPITAL.      Patient was also instructed on the below information:    Park in the Parking lot behind the hospital or in the [employer] [facility] across the street from the parking lot.  Parking is complimentary.  If you will be discharged the same day as your procedure, please arrange for a responsible adult to drive you home or  to accompany you if traveling by taxi.  YOU WILL NOT BE PERMITTED TO DRIVE OR TO LEAVE THE HOSPITAL ALONE AFTER SURGERY.  It is strongly recommended that you arrange for someone to remain with you for the first 24 hrs following your surgery.    Patient verbalized understanding of above instructions.

## 2023-10-16 ENCOUNTER — TELEPHONE (OUTPATIENT)
Dept: SURGERY | Facility: CLINIC | Age: 58
End: 2023-10-16
Payer: COMMERCIAL

## 2023-10-16 NOTE — TELEPHONE ENCOUNTER
Called to perform peer to peer.  Spoke with Dr. Shayne Paz, who states that authorization was sent to the incorrect department ([facility]) rather than gyn/colorectal and he is not authorized to approve case.  I asked that he speak with his manager to have case forwarded to the appropriate review panel as this was a mistake on the part of their company and needs to be handled ASAP.     Adry Zamora MD  Staff Surgeon   Colon & Rectal Surgery

## 2023-10-17 ENCOUNTER — TELEPHONE (OUTPATIENT)
Dept: SURGERY | Facility: CLINIC | Age: 58
End: 2023-10-17
Payer: COMMERCIAL

## 2023-10-17 NOTE — TELEPHONE ENCOUNTER
Spoke with pt regarding 0930 arrival time and location of procedure. Advised to stop clears at 0000 and continue with water or gatorade until she leaves home. Pt verbally confirms arrival time and location. Denies further questions or concerns.

## 2023-10-18 ENCOUNTER — HOSPITAL ENCOUNTER (OUTPATIENT)
Facility: OTHER | Age: 58
Discharge: HOME | End: 2023-10-18
Attending: SURGERY | Admitting: SURGERY
Payer: COMMERCIAL

## 2023-10-18 ENCOUNTER — ANESTHESIA (OUTPATIENT)
Dept: SURGERY | Facility: OTHER | Age: 58
End: 2023-10-18
Payer: COMMERCIAL

## 2023-10-18 DIAGNOSIS — R15.9 FECAL INCONTINENCE: ICD-10-CM

## 2023-10-18 PROCEDURE — 71000016 HC POSTOP RECOV ADDL HR: Performed by: SURGERY

## 2023-10-18 PROCEDURE — 36000706: Performed by: SURGERY

## 2023-10-18 PROCEDURE — D9220A PRA ANESTHESIA: Mod: ANES,,, | Performed by: ANESTHESIOLOGY

## 2023-10-18 PROCEDURE — 25000003 PHARM REV CODE 250: Performed by: ANESTHESIOLOGY

## 2023-10-18 PROCEDURE — 25000003 PHARM REV CODE 250: Performed by: NURSE ANESTHETIST, CERTIFIED REGISTERED

## 2023-10-18 PROCEDURE — D9220A PRA ANESTHESIA: ICD-10-PCS | Mod: CRNA,,, | Performed by: NURSE ANESTHETIST, CERTIFIED REGISTERED

## 2023-10-18 PROCEDURE — C1767 GENERATOR, NEURO NON-RECHARG: HCPCS | Performed by: SURGERY

## 2023-10-18 PROCEDURE — 63600175 PHARM REV CODE 636 W HCPCS: Performed by: ANESTHESIOLOGY

## 2023-10-18 PROCEDURE — 63600175 PHARM REV CODE 636 W HCPCS: Performed by: SURGERY

## 2023-10-18 PROCEDURE — 95972 ALYS CPLX SP/PN NPGT W/PRGRM: CPT | Mod: ,,, | Performed by: SURGERY

## 2023-10-18 PROCEDURE — D9220A PRA ANESTHESIA: ICD-10-PCS | Mod: ANES,,, | Performed by: ANESTHESIOLOGY

## 2023-10-18 PROCEDURE — 64590 INS/RPL PRPH SAC/GSTR NPG/R: CPT | Mod: ,,, | Performed by: SURGERY

## 2023-10-18 PROCEDURE — 71000015 HC POSTOP RECOV 1ST HR: Performed by: SURGERY

## 2023-10-18 PROCEDURE — 71000033 HC RECOVERY, INTIAL HOUR: Performed by: SURGERY

## 2023-10-18 PROCEDURE — 63600175 PHARM REV CODE 636 W HCPCS: Performed by: NURSE ANESTHETIST, CERTIFIED REGISTERED

## 2023-10-18 PROCEDURE — 25000003 PHARM REV CODE 250: Performed by: SURGERY

## 2023-10-18 PROCEDURE — 37000008 HC ANESTHESIA 1ST 15 MINUTES: Performed by: SURGERY

## 2023-10-18 PROCEDURE — C1787 PATIENT PROGR, NEUROSTIM: HCPCS | Performed by: SURGERY

## 2023-10-18 PROCEDURE — 37000009 HC ANESTHESIA EA ADD 15 MINS: Performed by: SURGERY

## 2023-10-18 PROCEDURE — 95972 PR PRG SPNL GEN CMPLX: ICD-10-PCS | Mod: ,,, | Performed by: SURGERY

## 2023-10-18 PROCEDURE — 36000707: Performed by: SURGERY

## 2023-10-18 PROCEDURE — D9220A PRA ANESTHESIA: Mod: CRNA,,, | Performed by: NURSE ANESTHETIST, CERTIFIED REGISTERED

## 2023-10-18 PROCEDURE — 64590 PR IMPLANT PERIPH/GASTRIC NEUROSTIM/RECEIVER: ICD-10-PCS | Mod: ,,, | Performed by: SURGERY

## 2023-10-18 PROCEDURE — C1889 IMPLANT/INSERT DEVICE, NOC: HCPCS | Performed by: SURGERY

## 2023-10-18 DEVICE — SYS INTERSTIM X RECHARGE FREE: Type: IMPLANTABLE DEVICE | Site: BACK | Status: FUNCTIONAL

## 2023-10-18 DEVICE — ENVELOPE TYRX ANTIBACT MED: Type: IMPLANTABLE DEVICE | Site: BACK | Status: FUNCTIONAL

## 2023-10-18 RX ORDER — ONDANSETRON 2 MG/ML
INJECTION INTRAMUSCULAR; INTRAVENOUS
Status: DISCONTINUED | OUTPATIENT
Start: 2023-10-18 | End: 2023-10-18

## 2023-10-18 RX ORDER — PHENYLEPHRINE HYDROCHLORIDE 10 MG/ML
INJECTION INTRAVENOUS
Status: DISCONTINUED | OUTPATIENT
Start: 2023-10-18 | End: 2023-10-18

## 2023-10-18 RX ORDER — MEPERIDINE HYDROCHLORIDE 25 MG/ML
12.5 INJECTION INTRAMUSCULAR; INTRAVENOUS; SUBCUTANEOUS ONCE AS NEEDED
Status: DISCONTINUED | OUTPATIENT
Start: 2023-10-18 | End: 2023-10-18

## 2023-10-18 RX ORDER — LIDOCAINE HYDROCHLORIDE 20 MG/ML
INJECTION INTRAVENOUS
Status: DISCONTINUED | OUTPATIENT
Start: 2023-10-18 | End: 2023-10-18

## 2023-10-18 RX ORDER — OXYCODONE HYDROCHLORIDE 5 MG/1
5 TABLET ORAL
Status: DISCONTINUED | OUTPATIENT
Start: 2023-10-18 | End: 2023-10-18 | Stop reason: HOSPADM

## 2023-10-18 RX ORDER — SUCCINYLCHOLINE CHLORIDE 20 MG/ML
INJECTION INTRAMUSCULAR; INTRAVENOUS
Status: DISCONTINUED | OUTPATIENT
Start: 2023-10-18 | End: 2023-10-18

## 2023-10-18 RX ORDER — ONDANSETRON 2 MG/ML
4 INJECTION INTRAMUSCULAR; INTRAVENOUS DAILY PRN
Status: DISCONTINUED | OUTPATIENT
Start: 2023-10-18 | End: 2023-10-18 | Stop reason: HOSPADM

## 2023-10-18 RX ORDER — FENTANYL CITRATE 50 UG/ML
INJECTION, SOLUTION INTRAMUSCULAR; INTRAVENOUS
Status: DISCONTINUED | OUTPATIENT
Start: 2023-10-18 | End: 2023-10-18

## 2023-10-18 RX ORDER — BUPIVACAINE HYDROCHLORIDE 2.5 MG/ML
INJECTION, SOLUTION EPIDURAL; INFILTRATION; INTRACAUDAL
Status: DISCONTINUED | OUTPATIENT
Start: 2023-10-18 | End: 2023-10-18 | Stop reason: HOSPADM

## 2023-10-18 RX ORDER — DEXAMETHASONE SODIUM PHOSPHATE 4 MG/ML
INJECTION, SOLUTION INTRA-ARTICULAR; INTRALESIONAL; INTRAMUSCULAR; INTRAVENOUS; SOFT TISSUE
Status: DISCONTINUED | OUTPATIENT
Start: 2023-10-18 | End: 2023-10-18

## 2023-10-18 RX ORDER — SODIUM CHLORIDE 0.9 % (FLUSH) 0.9 %
3 SYRINGE (ML) INJECTION
Status: DISCONTINUED | OUTPATIENT
Start: 2023-10-18 | End: 2023-10-18 | Stop reason: HOSPADM

## 2023-10-18 RX ORDER — ROCURONIUM BROMIDE 10 MG/ML
INJECTION, SOLUTION INTRAVENOUS
Status: DISCONTINUED | OUTPATIENT
Start: 2023-10-18 | End: 2023-10-18

## 2023-10-18 RX ORDER — SODIUM CHLORIDE, SODIUM LACTATE, POTASSIUM CHLORIDE, CALCIUM CHLORIDE 600; 310; 30; 20 MG/100ML; MG/100ML; MG/100ML; MG/100ML
INJECTION, SOLUTION INTRAVENOUS CONTINUOUS
Status: ACTIVE | OUTPATIENT
Start: 2023-10-18

## 2023-10-18 RX ORDER — MIDAZOLAM HYDROCHLORIDE 1 MG/ML
INJECTION INTRAMUSCULAR; INTRAVENOUS
Status: DISCONTINUED | OUTPATIENT
Start: 2023-10-18 | End: 2023-10-18

## 2023-10-18 RX ORDER — TRAMADOL HYDROCHLORIDE 50 MG/1
50 TABLET ORAL ONCE
Status: COMPLETED | OUTPATIENT
Start: 2023-10-18 | End: 2023-10-18

## 2023-10-18 RX ORDER — PROPOFOL 10 MG/ML
VIAL (ML) INTRAVENOUS
Status: DISCONTINUED | OUTPATIENT
Start: 2023-10-18 | End: 2023-10-18

## 2023-10-18 RX ORDER — HYDROMORPHONE HYDROCHLORIDE 2 MG/ML
0.4 INJECTION, SOLUTION INTRAMUSCULAR; INTRAVENOUS; SUBCUTANEOUS EVERY 5 MIN PRN
Status: DISCONTINUED | OUTPATIENT
Start: 2023-10-18 | End: 2023-10-18 | Stop reason: HOSPADM

## 2023-10-18 RX ORDER — KETOROLAC TROMETHAMINE 30 MG/ML
30 INJECTION, SOLUTION INTRAMUSCULAR; INTRAVENOUS ONCE
Status: DISCONTINUED | OUTPATIENT
Start: 2023-10-18 | End: 2023-10-18 | Stop reason: HOSPADM

## 2023-10-18 RX ADMIN — PHENYLEPHRINE HYDROCHLORIDE 100 MCG: 10 INJECTION INTRAVENOUS at 12:10

## 2023-10-18 RX ADMIN — PHENYLEPHRINE HYDROCHLORIDE 100 MCG: 10 INJECTION INTRAVENOUS at 11:10

## 2023-10-18 RX ADMIN — MIDAZOLAM HYDROCHLORIDE 2 MG: 1 INJECTION, SOLUTION INTRAMUSCULAR; INTRAVENOUS at 11:10

## 2023-10-18 RX ADMIN — ONDANSETRON HYDROCHLORIDE 4 MG: 2 INJECTION INTRAMUSCULAR; INTRAVENOUS at 12:10

## 2023-10-18 RX ADMIN — SODIUM CHLORIDE, SODIUM LACTATE, POTASSIUM CHLORIDE, AND CALCIUM CHLORIDE: .6; .31; .03; .02 INJECTION, SOLUTION INTRAVENOUS at 11:10

## 2023-10-18 RX ADMIN — HYDROMORPHONE HYDROCHLORIDE 0.4 MG: 2 INJECTION INTRAMUSCULAR; INTRAVENOUS; SUBCUTANEOUS at 12:10

## 2023-10-18 RX ADMIN — GLYCOPYRROLATE 0.2 MG: 0.2 INJECTION, SOLUTION INTRAMUSCULAR; INTRAVITREAL at 11:10

## 2023-10-18 RX ADMIN — GENTAMICIN SULFATE 165 MG: 40 INJECTION, SOLUTION INTRAMUSCULAR; INTRAVENOUS at 11:10

## 2023-10-18 RX ADMIN — TRAMADOL HYDROCHLORIDE 50 MG: 50 TABLET, COATED ORAL at 02:10

## 2023-10-18 RX ADMIN — VANCOMYCIN HYDROCHLORIDE 1000 MG: 1 INJECTION, POWDER, LYOPHILIZED, FOR SOLUTION INTRAVENOUS at 11:10

## 2023-10-18 RX ADMIN — LIDOCAINE HYDROCHLORIDE 100 MG: 20 INJECTION, SOLUTION INTRAVENOUS at 11:10

## 2023-10-18 RX ADMIN — OXYCODONE HYDROCHLORIDE 5 MG: 5 TABLET ORAL at 12:10

## 2023-10-18 RX ADMIN — DEXAMETHASONE SODIUM PHOSPHATE 8 MG: 4 INJECTION, SOLUTION INTRAMUSCULAR; INTRAVENOUS at 11:10

## 2023-10-18 RX ADMIN — CARBOXYMETHYLCELLULOSE SODIUM 2 DROP: 2.5 SOLUTION/ DROPS OPHTHALMIC at 11:10

## 2023-10-18 RX ADMIN — SUCCINYLCHOLINE CHLORIDE 120 MG: 20 INJECTION, SOLUTION INTRAMUSCULAR; INTRAVENOUS at 11:10

## 2023-10-18 RX ADMIN — PROPOFOL 150 MG: 10 INJECTION, EMULSION INTRAVENOUS at 11:10

## 2023-10-18 RX ADMIN — ROCURONIUM BROMIDE 5 MG: 10 SOLUTION INTRAVENOUS at 11:10

## 2023-10-18 RX ADMIN — FENTANYL CITRATE 100 MCG: 50 INJECTION, SOLUTION INTRAMUSCULAR; INTRAVENOUS at 11:10

## 2023-10-18 NOTE — TRANSFER OF CARE
"Anesthesia Transfer of Care Note    Patient: Charla Sharif    Procedure(s) Performed: Procedure(s) (LRB):  REVISION, NEUROSTIMULATOR, SACRAL vs. possible excision (N/A)    Patient location: PACU    Anesthesia Type: general    Transport from OR: Transported from OR on 2-3 L/min O2 by NC with adequate spontaneous ventilation    Post pain: adequate analgesia    Post assessment: no apparent anesthetic complications    Post vital signs: stable    Level of consciousness: awake and alert    Nausea/Vomiting: no nausea/vomiting    Complications: none    Transfer of care protocol was followed      Last vitals:   Visit Vitals  /67 (BP Location: Right arm, Patient Position: Lying)   Pulse 89   Temp 36.3 °C (97.3 °F) (Skin)   Resp 16   Ht 5' 3" (1.6 m)   Wt 59 kg (130 lb)   SpO2 99%   Breastfeeding No   BMI 23.03 kg/m²     "

## 2023-10-18 NOTE — OP NOTE
"[facility]  Operative Note    Date: 10/18/2023    Name: Charla Sharif    MRN: [id number]    Pre-Op Diagnosis: Fecal incontinence    Post-Op Diagnosis: Same    Procedure(s) Performed:   INSERTION, NEUROSTIMULATOR, PERMANENT, SACRAL    Specimen(s): None    Staff Surgeon: Adry Zamora MD    Assistant Surgeon: Wilber Ramires MD    Anesthesia:     Details of procedure:  The patient was taken to the operating room and transferred to the OR table in the prone position.  SCD boots were applied.  She was placed under sedation by the anesthesia team.  IV antibiotics were administered.  Her back was prepped with ChloraPrep and draped in the standard sterile fashion.  After an appropriate time-out, 0.25% Marcaine was injected near the incision for the planned battery.  The incision was then opened with a scalpel and sutures were removed.  We entered the previously created pocket.  The percutaneous extension was delivered. This was  from the lead, cut and delivered sterilely from the field. The quadripolar lead was then attached to the InterStim X implantable pulse generator, which was pre-placed within the pocket. The single setscrew was tightened until a "click" was heard.  The pocket had been thoroughly irrigated with sterile water. After impedance testing demonstrated no abnormalities on all electrode pairs, the incision was closed in 3 layers, deeply with a 3-0 Vicryl and superficially with a running 3-0 Monocryl. The incision was covered with Dermabond.  The procedure was then terminated.  All sponge instrument counts were correct.  The patient was awakened and transferred to recovery in good condition.  I was present scrubbed for the entire procedure.    Once awake and alert, complex electronic analysis and programming of the SNM pulse generator was performed, including setting active contact groups, amplitude, pulse width, frequency, cycling, and lockout parameters.     Estimated Blood Loss: " 5mL    Drains/Implants:   Implant Name Type Inv. Item Serial No.  Lot No. LRB No. Used Action   SYS INTERSTIM X RECHARGE FREE - [id number]  SYS INTERSTIM X RECHARGE FREE [id number] [employer]  Right 1 Implanted   ENVELOPE TYRX ANTIBACT MED - [id number]  ENVELOPE TYRX ANTIBACT MED  [employer] [place] [id number] Right 1 Implanted       Wound Class: I    Adry Zamora MD  Staff Surgeon   Colon & Rectal Surgery

## 2023-10-18 NOTE — INTERVAL H&P NOTE
The patient has been examined and the H&P has been reviewed:    I concur with the findings and no changes have occurred since H&P was written.    Surgery risks, benefits and alternative options discussed and understood by patient/family.    Bowel diaries reviewed, marked improvement. Plan for permanent battery implant today      There are no hospital problems to display for this patient.

## 2023-10-18 NOTE — ANESTHESIA POSTPROCEDURE EVALUATION
Anesthesia Post Evaluation    Patient: Charla Sharif    Procedure(s) Performed: Procedure(s) (LRB):  REVISION, NEUROSTIMULATOR, SACRAL vs. possible excision (N/A)    Final Anesthesia Type: general      Patient location during evaluation: PACU  Patient participation: Yes- Able to Participate  Level of consciousness: awake and alert  Post-procedure vital signs: reviewed and stable  Pain management: adequate  Airway patency: patent    PONV status at discharge: No PONV  Anesthetic complications: no      Cardiovascular status: blood pressure returned to baseline  Respiratory status: unassisted and spontaneous ventilation  Hydration status: euvolemic  Follow-up not needed.          Vitals Value Taken Time   /67 10/18/23 1246   Temp 36.3 °C (97.3 °F) 10/18/23 1234   Pulse 83 10/18/23 1247   Resp 16 10/18/23 1246   SpO2 100 % 10/18/23 1247   Vitals shown include unvalidated device data.      No case tracking events are documented in the log.      Pain/Fede Score: Pain Rating Prior to Med Admin: 7 (10/18/2023 12:46 PM)

## 2023-10-18 NOTE — ANESTHESIA PROCEDURE NOTES
Intubation    Date/Time: 10/18/2023 11:46 AM    Performed by: Adry Yoder CRNA  Authorized by: Carlos Atkinson MD    Intubation:     Induction:  Intravenous    Intubated:  Postinduction    Mask Ventilation:  Easy mask    Attempts:  1    Attempted By:  CRNA    Method of Intubation:  Video laryngoscopy    Blade:  Banerjee 3    Difficult Airway Encountered?: No      Complications:  None    Airway Device:  Oral endotracheal tube    Airway Device Size:  7.0    Style/Cuff Inflation:  Cuffed    Tube secured:  21    Secured at:  The lips    Placement Verified By:  Capnometry    Complicating Factors:  None    Findings Post-Intubation:  BS equal bilateral and atraumatic/condition of teeth unchanged

## 2023-10-18 NOTE — PLAN OF CARE
Charla Sharif has met all discharge criteria from Phase II. Vital Signs are stable, ambulating  without difficulty.Pain is now under control and tolerable for the pt. Pain score 5 at this time.  Discharge instructions given, patient verbalized understanding. Discharged from facility via wheelchair in stable condition.

## 2023-10-18 NOTE — DISCHARGE SUMMARY
[facility] ([place])  Discharge Note  Short Stay    Procedure(s) (LRB):  Stage II interstim       OUTCOME: Patient tolerated treatment/procedure well without complication and is now ready for discharge.    DISPOSITION: Home or Self Care    FINAL DIAGNOSIS:  fecal incontinence    FOLLOWUP: In clinic    DISCHARGE INSTRUCTIONS:    Anal Surgery Post Op Instructions:    1. Take tylenol and ibuprofen for pain.    2. Okay to shower tomorrow.  Do not submerge in tub for 2 weeks  3. Call [phone] for worsening pain, redness or drainage from incision, inability to urinate or fever > 101.  Call or schedule follow up via [facility] for 2-3 weeks after surgery     Adry Zamora MD  Staff Surgeon  Colon & Rectal Surgery

## 2023-10-18 NOTE — ANESTHESIA PREPROCEDURE EVALUATION
10/18/2023  Charla Sharif is a 58 y.o., female.      Pre-op Assessment    I have reviewed the Patient Summary Reports.     I have reviewed the Nursing Notes. I have reviewed the NPO Status.   I have reviewed the Medications.     Review of Systems  Anesthesia Hx:  No problems with previous Anesthesia  Denies Family Hx of Anesthesia complications.   Denies Personal Hx of Anesthesia complications.   Social:  Former Smoker, No Alcohol Use    Hematology/Oncology:  Hematology Normal   Oncology Normal     Cardiovascular:   Hypertension    Pulmonary:  Pulmonary Normal    Renal/:  Renal/ Normal     Hepatic/GI:  Hepatic/GI Normal    Neurological:  Neurology Normal    Endocrine:  Endocrine Normal    Psych:   Psychiatric History (ADD) anxiety          Physical Exam  General: Well nourished and Cooperative    Airway:  Mallampati: I   Mouth Opening: Normal  TM Distance: Normal  Neck ROM: Normal ROM    Dental:  Intact, Braces        Anesthesia Plan  Type of Anesthesia, risks & benefits discussed:    Anesthesia Type: Gen ETT  Intra-op Monitoring Plan: Standard ASA Monitors  Post Op Pain Control Plan: multimodal analgesia  Induction:  IV  Airway Plan: Video  Informed Consent: Informed consent signed with the Patient and all parties understand the risks and agree with anesthesia plan.  All questions answered.   ASA Score: 2  Day of Surgery Review of History & Physical: H&P Update referred to the surgeon/provider.    Ready For Surgery From Anesthesia Perspective.     .

## 2023-10-18 NOTE — DISCHARGE INSTRUCTIONS
PLEASE FOLLOW ANY INSTRUCTIONS GIVEN TO YOU BY DR. BLUM.     Post Op Instructions:     1. Take tylenol and ibuprofen for pain.    2. Okay to shower tomorrow.  Do not submerge in tub for 2 weeks  3. Call [phone] for worsening pain, redness or drainage from incision, inability to urinate or fever > 101.  Call or schedule follow up via mychart for 2-3 weeks after surgery      Adry Blum MD  Staff Surgeon  Colon & Rectal Surgery

## 2023-10-19 VITALS
HEIGHT: 63 IN | BODY MASS INDEX: 23.04 KG/M2 | RESPIRATION RATE: 18 BRPM | HEART RATE: 84 BPM | WEIGHT: 130 LBS | DIASTOLIC BLOOD PRESSURE: 60 MMHG | OXYGEN SATURATION: 96 % | SYSTOLIC BLOOD PRESSURE: 111 MMHG | TEMPERATURE: 98 F

## 2023-10-20 ENCOUNTER — TELEPHONE (OUTPATIENT)
Dept: SURGERY | Facility: CLINIC | Age: 58
End: 2023-10-20
Payer: COMMERCIAL

## 2023-10-20 NOTE — TELEPHONE ENCOUNTER
Spoke with pt regarding PO appt for interstim. Offered pt to be seen on 11/6 at 2:40 PM in the [facility]. Pt verbally confirmed date, time, and location. Sending appt reminder in the mail. Address confirmed. Pt denies further questions at this time.

## 2023-10-31 ENCOUNTER — TELEPHONE (OUTPATIENT)
Dept: SURGERY | Facility: CLINIC | Age: 58
End: 2023-10-31
Payer: COMMERCIAL

## 2023-10-31 NOTE — TELEPHONE ENCOUNTER
Spoke with pt regarding a sooner appt on 11/6. Offered pt to be seen at 0820 at [facility]. Reviewed clinic location. Pt verbally confirms new time and location for appt. Denies questions.      ----- Message from Daisy Melendez sent at 10/31/2023 12:14 PM CDT -----  Regarding: reschedule Post-Op visit  Contact: pt @[phone]  Patient is calling to reschedule her Post-Op visit that is on Monday 11-6 pt prefers an early morning appt. Please c/b to advise..Thanks

## 2023-11-02 ENCOUNTER — TELEPHONE (OUTPATIENT)
Dept: SURGERY | Facility: HOSPITAL | Age: 58
End: 2023-11-02
Payer: COMMERCIAL

## 2023-11-02 NOTE — TELEPHONE ENCOUNTER
Contacted pt to conf appt//completed//pt is aware of location    

## 2023-11-06 ENCOUNTER — OFFICE VISIT (OUTPATIENT)
Dept: SURGERY | Facility: CLINIC | Age: 58
End: 2023-11-06
Payer: COMMERCIAL

## 2023-11-06 VITALS
DIASTOLIC BLOOD PRESSURE: 79 MMHG | SYSTOLIC BLOOD PRESSURE: 123 MMHG | WEIGHT: 137.81 LBS | BODY MASS INDEX: 24.42 KG/M2 | RESPIRATION RATE: 19 BRPM | OXYGEN SATURATION: 98 % | HEIGHT: 63 IN | HEART RATE: 89 BPM

## 2023-11-06 DIAGNOSIS — R15.9 FULL INCONTINENCE OF FECES: Primary | ICD-10-CM

## 2023-11-06 PROCEDURE — 3078F PR MOST RECENT DIASTOLIC BLOOD PRESSURE < 80 MM HG: ICD-10-PCS | Mod: CPTII,S$GLB,, | Performed by: SURGERY

## 2023-11-06 PROCEDURE — 3074F PR MOST RECENT SYSTOLIC BLOOD PRESSURE < 130 MM HG: ICD-10-PCS | Mod: CPTII,S$GLB,, | Performed by: SURGERY

## 2023-11-06 PROCEDURE — 1159F PR MEDICATION LIST DOCUMENTED IN MEDICAL RECORD: ICD-10-PCS | Mod: CPTII,S$GLB,, | Performed by: SURGERY

## 2023-11-06 PROCEDURE — 99999 PR PBB SHADOW E&M-EST. PATIENT-LVL IV: ICD-10-PCS | Mod: PBBFAC,,, | Performed by: SURGERY

## 2023-11-06 PROCEDURE — 99024 POSTOP FOLLOW-UP VISIT: CPT | Mod: S$GLB,,, | Performed by: SURGERY

## 2023-11-06 PROCEDURE — 3074F SYST BP LT 130 MM HG: CPT | Mod: CPTII,S$GLB,, | Performed by: SURGERY

## 2023-11-06 PROCEDURE — 99024 PR POST-OP FOLLOW-UP VISIT: ICD-10-PCS | Mod: S$GLB,,, | Performed by: SURGERY

## 2023-11-06 PROCEDURE — 1159F MED LIST DOCD IN RCRD: CPT | Mod: CPTII,S$GLB,, | Performed by: SURGERY

## 2023-11-06 PROCEDURE — 4010F PR ACE/ARB THEARPY RXD/TAKEN: ICD-10-PCS | Mod: CPTII,S$GLB,, | Performed by: SURGERY

## 2023-11-06 PROCEDURE — 4010F ACE/ARB THERAPY RXD/TAKEN: CPT | Mod: CPTII,S$GLB,, | Performed by: SURGERY

## 2023-11-06 PROCEDURE — 99999 PR PBB SHADOW E&M-EST. PATIENT-LVL IV: CPT | Mod: PBBFAC,,, | Performed by: SURGERY

## 2023-11-06 PROCEDURE — 3078F DIAST BP <80 MM HG: CPT | Mod: CPTII,S$GLB,, | Performed by: SURGERY

## 2023-11-06 NOTE — PROGRESS NOTES
Colon & Rectal Surgery Clinic Follow Up    HPI:   Charla Sharif is a 58 y.o. female who presents for follow up of fecal incontinence and hemorrhoids    10/4/23 interstim stage I     10/18/23 Interstim stage II     Interval history:   Reports that she has had zero episodes of fecal incontinence since her stage II insertion.  She is having daily, soft bowel movements.  Is still having some urinary leakage. Hemorrhoids are less bothersome now that fecal frequency improved.       Objective:   Vitals:    11/06/23 0828   BP: 123/79   Pulse: 89   Resp: 19        Physical Exam   Gen: well developed female, NAD  HEENT: normocephalic, atraumatic, PERRL, EOMI   CV: RRR, no murmurs  Resp: nonlabored, CTAB   Abd: soft, NTND   MSK: right gluteal incision well healed without erythema, drainage or fluctuance.    Assessment and Plan:   Charla Sharif  is a 58 y.o. female who presents for follow up of fecal incontinence and hemorrhoids    - patient doing well after stage II interstim, no episodes of incontinence since insertion.  Hemorrhoid symptoms improved with resolution of fecal incontinence.   - still having some urinary symptoms, will follow up with Dr. Barton.   - We reviewed return precautions including redness/pain/drainage at generator pocket, need for lead revision or battery replacement.  Will have annual follow up to ensure proper function.       Adry Zamora MD  Staff Surgeon   Colon & Rectal Surgery

## 2023-11-07 ENCOUNTER — TELEPHONE (OUTPATIENT)
Dept: UROGYNECOLOGY | Facility: OTHER | Age: 58
End: 2023-11-07
Payer: COMMERCIAL

## 2023-11-08 NOTE — TELEPHONE ENCOUNTER
----- Message from Adry Zamora MD sent at 11/6/2023  8:35 PM CST -----  Correct! Have never been able to reproduce any prolapse, has always been hemorrhoids.     ----- Message -----  From: Carrol Barton MD  Sent: 11/6/2023   6:23 PM CST  To: Adry Zamora MD    You didn't see rectal prolapse, correct? Just making sure doesn't need anything from your standpoint if she wants to have surgery for vaginal POP. Thanks!  ----- Message -----  From: Adry Zamora MD  Sent: 11/6/2023   8:50 AM CST  To: Carrol Barton MD; Oralia Forbes    Ms. Sharif had successful interstim placement and is ready for follow up with Dr. Barton for POP.    Hemorrhoid symptoms are resolved since fixing FI so will not need any further intervention.

## 2023-11-14 ENCOUNTER — TELEPHONE (OUTPATIENT)
Dept: UROGYNECOLOGY | Facility: CLINIC | Age: 58
End: 2023-11-14
Payer: COMMERCIAL

## 2023-11-14 DIAGNOSIS — N95.2 VAGINAL ATROPHY: ICD-10-CM

## 2023-11-14 DIAGNOSIS — N39.41 URINARY INCONTINENCE, URGE: Primary | ICD-10-CM

## 2023-11-14 RX ORDER — TROSPIUM CHLORIDE ER 60 MG/1
60 CAPSULE ORAL DAILY
Qty: 30 CAPSULE | Refills: 11 | Status: SHIPPED | OUTPATIENT
Start: 2023-11-14 | End: 2024-03-01

## 2023-11-14 RX ORDER — ESTRADIOL 0.1 MG/G
CREAM VAGINAL
Qty: 42.5 G | Refills: 11 | Status: SHIPPED | OUTPATIENT
Start: 2023-11-14 | End: 2024-03-01

## 2023-11-14 NOTE — TELEPHONE ENCOUNTER
Spoke with patient. Had IS with Dr. Zamora--states is lifechanging.    Vaginal POP is +/- present but not too bothersome.   Still having UI, is bothersome at times. Sounds like most UI is UUI.    Did go to PT 1-2x--felt humiliating. Was embarrassed by type of PT. Practices exercises at home.   Not vaping or smoking: end of Sept/Oct.     PLAN:  --start sanctura 60 xl  --continue PT exercises at home  --continue vaginal estrogen  --continue smoking/vaping cessation  --will reassess symptoms at 1/2024 appt to determine if needs surgery    ----- Message from Adry Zamora MD sent at 11/6/2023  8:48 AM CST -----  Ms. Sharif had successful interstim placement and is ready for follow up with Dr. Barton for POP.    Hemorrhoid symptoms are resolved since fixing FI so will not need any further intervention.

## 2023-11-29 NOTE — TELEPHONE ENCOUNTER
----- Message from Radha Gr sent at 6/12/2023  1:50 PM CDT -----  Contact: @[phone]  Pt is calling in to get a refill on prescription estradioL (ESTRACE) 0.01 % (0.1 mg/gram) vaginal cream), please call to discuss further.    [facility] - [place], [place] - [address]  [address]  [place] [address]  Phone: [phone] Fax: [phone]         Ok to refill?

## 2024-01-19 ENCOUNTER — OFFICE VISIT (OUTPATIENT)
Dept: UROGYNECOLOGY | Facility: CLINIC | Age: 59
End: 2024-01-19
Payer: COMMERCIAL

## 2024-01-19 VITALS
WEIGHT: 143.06 LBS | DIASTOLIC BLOOD PRESSURE: 87 MMHG | SYSTOLIC BLOOD PRESSURE: 122 MMHG | BODY MASS INDEX: 25.35 KG/M2

## 2024-01-19 DIAGNOSIS — K64.8 INTERNAL PROLAPSED HEMORRHOIDS: ICD-10-CM

## 2024-01-19 DIAGNOSIS — N81.11 CYSTOCELE, MIDLINE: ICD-10-CM

## 2024-01-19 DIAGNOSIS — Z87.891 HISTORY OF TOBACCO USE: ICD-10-CM

## 2024-01-19 DIAGNOSIS — N95.2 VAGINAL ATROPHY: ICD-10-CM

## 2024-01-19 DIAGNOSIS — M79.18 MYALGIA OF PELVIC FLOOR: ICD-10-CM

## 2024-01-19 DIAGNOSIS — K59.00 CONSTIPATION, UNSPECIFIED CONSTIPATION TYPE: ICD-10-CM

## 2024-01-19 DIAGNOSIS — N81.6 RECTOCELE, FEMALE: ICD-10-CM

## 2024-01-19 DIAGNOSIS — N99.3 VAGINAL VAULT PROLAPSE, POSTHYSTERECTOMY: ICD-10-CM

## 2024-01-19 DIAGNOSIS — N39.46 URINARY INCONTINENCE, MIXED: ICD-10-CM

## 2024-01-19 DIAGNOSIS — R19.8 STRAINING WITH STOOLS: ICD-10-CM

## 2024-01-19 DIAGNOSIS — N39.41 URINARY INCONTINENCE, URGE: Primary | ICD-10-CM

## 2024-01-19 DIAGNOSIS — R15.9 INCONTINENCE OF FECES, UNSPECIFIED FECAL INCONTINENCE TYPE: ICD-10-CM

## 2024-01-19 PROCEDURE — 99213 OFFICE O/P EST LOW 20 MIN: CPT | Mod: S$GLB,,, | Performed by: OBSTETRICS & GYNECOLOGY

## 2024-01-19 PROCEDURE — 1159F MED LIST DOCD IN RCRD: CPT | Mod: CPTII,S$GLB,, | Performed by: OBSTETRICS & GYNECOLOGY

## 2024-01-19 PROCEDURE — 3079F DIAST BP 80-89 MM HG: CPT | Mod: CPTII,S$GLB,, | Performed by: OBSTETRICS & GYNECOLOGY

## 2024-01-19 PROCEDURE — 99999 PR PBB SHADOW E&M-EST. PATIENT-LVL V: CPT | Mod: PBBFAC,,, | Performed by: OBSTETRICS & GYNECOLOGY

## 2024-01-19 PROCEDURE — 3008F BODY MASS INDEX DOCD: CPT | Mod: CPTII,S$GLB,, | Performed by: OBSTETRICS & GYNECOLOGY

## 2024-01-19 PROCEDURE — 1160F RVW MEDS BY RX/DR IN RCRD: CPT | Mod: CPTII,S$GLB,, | Performed by: OBSTETRICS & GYNECOLOGY

## 2024-01-19 PROCEDURE — 3074F SYST BP LT 130 MM HG: CPT | Mod: CPTII,S$GLB,, | Performed by: OBSTETRICS & GYNECOLOGY

## 2024-01-19 NOTE — PATIENT INSTRUCTIONS
1) Rectal prolapse vs hemorrhoids:  --management per Dr. Zamora in CRS   --MR defo 2022 NEG   --no prolapse: has not been able to reproduce in office or per photo--none today on exam   --hemorrhoids+: Dr. Zamora was planning removal at time of any needed UG surgery  --will work to minimize straining with BMs (see below)    2)  Vaginal atrophy (dryness):  Use 0.5 gram of estrogen cream in vagina at night twice a week.   --will help general discomfort and drynes    3)  Mixed urinary incontinence, urge < stress:    --Empty bladder every 3 hours.  Empty well: wait a minute, lean forward on toilet.    --Avoid dietary irritants (see sheet).  Keep diary x 3-5 days to determine your irritants.  --start pelvic floor PT  --URGE: consider medication in future. Takes 2-4 weeks to see if will have effect.  For dry mouth: get sour, sugar free lozenge or gum.    --STRESS:  Pessary vs. Sling.    --UDS vs cystometry preop to see if needs concomitant MUS   --NO VAPING    4)  constipation/straining with stools:  --continue high fiber diet + 1 fiber pill/day (may need to increase to 2-3 pills/day)  --consider amitiza and miralax as needed  --control straining  --hydrate well  --continue pelvic floor PT exercises at home      5)  Stage 2 cystocele/rectocele, stage 1 vaginal vault prolapse:  --discussed  --options: observation vs pessary/PT (won't get rid of bulge but can help pressure) vs surgery   --if surgery: robotic/laparoscopic sacral colpopexy vs uterosacral suspension (would lean toward ASC due to rectal prolapse)   --if surgery: no rectal prolapse per Dr. Zamora  --no rectal prolapse on today's exam (would she do ventral mesh rectopexy or suture rectopexy?)  --hemorrhoids+: would plan concomitant removal   --if surgery: needs UDS/cysto vs office cystometry preop to determine need for concomitant MUS for BECKY   --if surgery: needs clearance per PCP + labs (CBC, CMP, T&S)/EKG   --if surgery: went to PFPT x 2 but was embarrassed     6)   Vapes occasionally:  --have to stop at least 1 month before surgery; then no vaping x 3 months (at least postop)   --cannot fix leakage at same time if vaping because sling doesn't heal with nicotine use    7)  Fecal incontinence:  --new symptom  --s/p SNS 10/2023 Sera  --to keep diary    8)  Someone will call you to discuss dates and set up testing/consents.

## 2024-01-19 NOTE — PROGRESS NOTES
Urogyn follow up  01/19/2024    [facility] - UROGYNECOLOGY  [address] [address]    Charla Sharif  [id number]  1965      Charla Sharif is a 58 y.o. here for a urogyn follow up. The patient's last visit with me was on 8/14/2023.      1)  UI:  (+) BECKY > (+) UUI  X 1 year.  (+) pads (for fecal issues, no urine on pads).  Daytime frequency: Q 1 hours.  Nocturia: Yes: 2/night.   (+) dysuria,  (--) hematuria,  (--) frequent UTIs.  (--) complete bladder emptying. +PV dribbling. DV with variable 2nd amount.     2)  POP:  Present x 1-2 months. To introitus.  Symptoms:(+)  pressure.  (--) vaginal bleeding. (--) vaginal discharge. (+) sexually active.  (--) dyspareunia.  (+)  Vaginal dryness. (+) vaginal estrogen use--uses 0.5 3x/week--not consistent.     3)  BM:  (+) constipation/straining x several months since rectal prolapse. Was not constipated before that. Taking linzess-not sure is helping. Has BM Q4 days.   (--) chronic diarrhea. (--) hematochezia.  + fecal incontinence (2x/week).  (--) fecal smearing/urgency.  (--) complete evacuation.  NO splinting.   --2 PPD for fecal smearing   --mucus discharge+  --rectal prolapse x 2 months  --8/2022 dynamic MRI:  Impression:  1.  Anatomic findings: Postop change of hysterectomy.  2.  Functional evaluation: Normal anorectal junction location and widened levator hiatus width at rest with mild/Grade 1 (2-4 cm) descent and mild/Grade 1 (6-8 cm) widening during defecation.  3.  Anterior compartment findings: Grade 1 cystocele.  4.  Middle compartment findings: Normal.  5.  Posterior compartment findings: Grade 1 peritoneocele and moderate rectocele.    --initial exam:  PELVIC:    External genitalia:  Normal Bartholins, Skenes and labia bilaterally.    Urethra:  No caruncle, diverticulum or masses.  (+) hypermobility.    Vagina:  Atrophy (+) , no bladder masses or tender, no discharge.  +TTP LV B.  Cervix:  absent  Uterus: uterus  absent  Adnexa: Not palpable  POP-Q:  Aa 0; Ba 0; C -8; Ap 0; Bp 0.  Genital hiatus 4, perineal body 2, total vaginal length 10.    Past Medical History  Past Medical History:   Diagnosis Date    ADD (attention deficit disorder)     Anxiety    HTN  Sciatica: takes gabapentin + mobic daily     Past Surgical History  Past Surgical History:   Procedure Laterality Date    BACK SURGERY      CHOLECYSTECTOMY      HYSTERECTOMY      INSERTION, NEUROSTIMULATOR, SACRAL N/A 10/18/2023    Procedure: INSERTION, NEUROSTIMULATOR, SACRAL;  Surgeon: Adry Zamora MD;  Location: [facility] OR;  Service: Colon and Rectal;  Laterality: N/A;  vs. possible excision    INSERTION, NEUROSTIMULATOR, TEMPORARY, SACRAL N/A 10/4/2023    Procedure: INSERTION, NEUROSTIMULATOR, TEMPORARY, SACRAL;  Surgeon: Adry Zamora MD;  Location: [facility] OR;  Service: Colon and Rectal;  Laterality: N/A;   --discectomy (?lumbar) 3/2023  --LSC esther     Hysterectomy: Yes   Date: .  Indication: emergent for ovarian cyst rupture.    Type: xlap/Pfannenstiel  Cervix present: No  Ovaries present: No  Other procedures at time of hysterectomy:  none    Past Ob History     x 2.  C/s x 0.    Largest infant weight: 9#  yes FAVD. yes episiotomy.      Gynecologic History  LMP: No LMP recorded.  Age of menarche: 8th grade  Age of menopause: with hyst  Menstrual history: h/o menorrhagia  Pap test: post IVA.  History of abnormal paps: No.  History of STIs:  No  Mammogram: Date of last:  ([place]).  Result: Normal per report.  Colonoscopy: Date of last:  ([place]).  Result:  normal per report.  Repeat due:  per CRS/PCP.    DEXA:  none    Issues include:  Patient Active Problem List   Diagnosis    Urinary incontinence, mixed    Rectal prolapse    Vaginal vault prolapse, posthysterectomy    Myalgia of pelvic floor    Straining with stools    Constipation    Vaginal atrophy    Cystocele, midline    Rectocele, female    Internal prolapsed hemorrhoids     Full incontinence of feces    History of tobacco use       History since last visit:   1) Rectal prolapse:  --no s/sx   --still feeling some tissue bulging at anal area, marlyn with more activities and straining with BMs   --still feels something coming out--worries about rectal prolapse  --now having some FI and mucus discharge  --saw Dr. Zamora ([facility]) 2023:  - No evidence of full thickness rectal prolapse  - We discussed risks, benefits and alternatives of excisional hemorrhoidectomy.  We discussed postoperative pain expectations, risk of bleeding, and infection.  Consent obtained    2022 MR perez:  Impression:  1.  Anatomic findings: Postop change of hysterectomy.  2.  Functional evaluation: Normal anorectal junction location and widened levator hiatus width at rest with mild/Grade 1 (2-4 cm) descent and mild/Grade 1 (6-8 cm) widening during defecation.  3.  Anterior compartment findings: Grade 1 cystocele.  4.  Middle compartment findings: Normal.  5.  Posterior compartment findings: Grade 1 peritoneocele and moderate rectocele.    2)  Vaginal atrophy (dryness):  using vaginal estrogen 2x/week--sometimes forgets    3)  Mixed urinary incontinence, urge < stress:    --last visit: still having AMOS, may be worse; 1 PPD, min wetness  --today: BECKY>UUI; 3-4 PPD, min wetness unless accident; nocturia 4-5x/PM.   --never did UDS  --did 2 PFPT sessions; embarrassed; does practice on own    4)  constipation/straining with stools:  --no major straining s/p SNS + daily fiber pills  --was taking amitiza 24 mcg daily +high fiber diet + 1 fiber pill/day + miralax PRN (not weekly)  --not taking narcotics  --takes xanaflex for back pain; has some residual numbness--takes gabapentin  --certain foods trigger  --did not start PT--had back surgery + father     5)  Stage 2 cystocele/rectocele, stage 1 vaginal vault prolapse:  --feels worse than last visit  --still bothersome    6)  Fecal incontinence:  --s/p SNS 10/2023 Sera  --says  goes through a few diapers a day    7)  Smoking:  --stopped years ago  --stopped vaping 3-4 months ago; vapes once/month if stressed    Medications:    Current Outpatient Medications:     amLODIPine (NORVASC) 5 MG tablet, Take 5 mg by mouth once daily., Disp: , Rfl:     ARIPiprazole (ABILIFY) 2 MG Tab, Take 2 mg by mouth once daily., Disp: , Rfl:     atomoxetine (STRATTERA) 80 MG capsule, Take 80 mg by mouth once daily., Disp: , Rfl:     bisacodyL (DULCOLAX) 5 mg EC tablet, Take 5 mg by mouth daily as needed., Disp: , Rfl:     busPIRone (BUSPAR) 15 MG tablet, TAKE 1/2 TABLET BY MOUTH 3 TIMES DAILY, Disp: , Rfl:     busPIRone (BUSPAR) 15 MG tablet,  TAKE 1/2 TABLET BY MOUTH 3 TIMES DAILY, Disp: , Rfl:     busPIRone (BUSPAR) 7.5 MG tablet, Take 7.5 mg by mouth 3 (three) times daily., Disp: , Rfl:     busPIRone (BUSPAR) 7.5 MG tablet, Take 7.5 mg by mouth., Disp: , Rfl:     clonazePAM (KLONOPIN) 0.5 MG tablet, Take 0.5 mg by mouth 2 (two) times daily as needed for Anxiety., Disp: , Rfl:     clonazePAM (KLONOPIN) 0.5 MG tablet, , Disp: , Rfl:     enalapril (VASOTEC) 20 MG tablet, Take 1 tablet by mouth 2 (two) times daily., Disp: , Rfl:     escitalopram oxalate (LEXAPRO ORAL), , Disp: , Rfl:     EScitalopram oxalate (LEXAPRO) 20 MG tablet, Take 20 mg by mouth once daily., Disp: , Rfl:     estradioL (ESTRACE) 0.01 % (0.1 mg/gram) vaginal cream, 0.5 grams with applicator or dime-sized amount with finger in vagina nightly x 2 weeks, then twice a week thereafter, Disp: 42.5 g, Rfl: 11    estradioL (ESTRACE) 0.01 % (0.1 mg/gram) vaginal cream, 0.5 grams with applicator or dime-sized amount with finger in vagina nightly x 2 weeks, then twice a week thereafter, Disp: 42.5 g, Rfl: 11    gabapentin (NEURONTIN) 300 MG capsule, TAKE 1 CAPSULE BY MOUTH 3 TIMES DAILY, Disp: , Rfl:     lubiprostone (AMITIZA) 24 MCG Cap, Take by mouth 2 (two) times daily with meals., Disp: , Rfl:     meloxicam (MOBIC) 15 MG tablet, Take 15 mg by mouth  every morning., Disp: , Rfl:     tiZANidine (ZANAFLEX) 4 MG tablet, Take 4 mg by mouth every 8 (eight) hours as needed., Disp: , Rfl:     trospium (SANCTURA XR) 60 mg Cp24 capsule, Take 1 capsule (60 mg total) by mouth once daily., Disp: 30 capsule, Rfl: 11  No current facility-administered medications for this visit.    Facility-Administered Medications Ordered in Other Visits:     0.9%  NaCl infusion, , Intravenous, Continuous, Gracie Jacobs NP    lactated ringers infusion, , Intravenous, Continuous, Tigre Casanova MD    LIDOcaine (PF) 10 mg/ml (1%) injection 10 mg, 1 mL, Intradermal, Once, Gracie Jacobs NP    mupirocin 2 % ointment, , Nasal, On Call Procedure, Gracie Jacobs NP, Given at 10/04/23 1020    ROS:  As per HPI.      Exam  /87 (BP Location: Left arm, Patient Position: Sitting, BP Method: Medium (Automatic))   Wt 64.9 kg (143 lb 1.3 oz)   BMI 25.35 kg/m²   General: alert and oriented, no acute distress  Abd: soft, NT, ND  INC: Pfannenstiel and LSC well-healed    PELVIC:    External genitalia:  Normal Bartholins, Skenes and labia bilaterally.    Urethra:  No caruncle, diverticulum or masses.  (+) hypermobility.    Vagina:  Atrophy (+) , no bladder masses or tender, no discharge.  +TTP LV B.  Cervix:  absent  Uterus: uterus absent  Adnexa: Not palpable  POP-Q:  Aa 0; Ba 0; C -5; Ap 0; Bp 0.  Genital hiatus 4, perineal body 2, total vaginal length 10 (standing).    Rectal:   --external: +hemorrhoids, non-thrombosed; no obvious rectal prolapse on photo  --internal: deferred to CRS    Impression  1. Urinary incontinence, urge        2. Vaginal atrophy        3. Urinary incontinence, mixed        4. Vaginal vault prolapse, posthysterectomy        5. Cystocele, midline        6. Rectocele, female        7. Straining with stools        8. Myalgia of pelvic floor        9. Internal prolapsed hemorrhoids        10. Incontinence of feces, unspecified fecal incontinence type        11.  Constipation, unspecified constipation type        12. History of tobacco use            We reviewed the above issues and discussed options for short-term versus long-term management of her problems.   Plan:   1) Rectal prolapse vs hemorrhoids:  --management per Dr. Zamora in CRS   --MR perez 2022 NEG   --no prolapse: has not been able to reproduce in office or per photo--none today on exam   --hemorrhoids+: Dr. Zamora was planning removal at time of any needed UG surgery  --will work to minimize straining with BMs (see below)    2)  Vaginal atrophy (dryness):  Use 0.5 gram of estrogen cream in vagina at night twice a week.   --will help general discomfort and drynes    3)  Mixed urinary incontinence, urge < stress:    --Empty bladder every 3 hours.  Empty well: wait a minute, lean forward on toilet.    --Avoid dietary irritants (see sheet).  Keep diary x 3-5 days to determine your irritants.  --start pelvic floor PT  --URGE: consider medication in future. Takes 2-4 weeks to see if will have effect.  For dry mouth: get sour, sugar free lozenge or gum.    --STRESS:  Pessary vs. Sling.    --UDS vs cystometry preop to see if needs concomitant MUS   --NO VAPING    4)  constipation/straining with stools:  --continue high fiber diet + 1 fiber pill/day (may need to increase to 2-3 pills/day)  --consider amitiza and miralax as needed  --control straining  --hydrate well  --continue pelvic floor PT exercises at home      5)  Stage 2 cystocele/rectocele, stage 1 vaginal vault prolapse:  --discussed  --options: observation vs pessary/PT (won't get rid of bulge but can help pressure) vs surgery   --if surgery: robotic/laparoscopic sacral colpopexy vs uterosacral suspension (would lean toward ASC due to rectal prolapse)   --if surgery: no rectal prolapse per Dr. Zamora  --no rectal prolapse on today's exam (would she do ventral mesh rectopexy or suture rectopexy?)  --hemorrhoids+: would plan concomitant removal   --if surgery: needs  UDS/cysto vs office cystometry preop to determine need for concomitant MUS for BECKY   --if surgery: needs clearance per PCP + labs (CBC, CMP, T&S)/EKG   --if surgery: went to PFPT x 2 but was embarrassed     6)  Vapes occasionally:  --have to stop at least 1 month before surgery; then no vaping x 3 months (at least postop)   --cannot fix leakage at same time if vaping because sling doesn't heal with nicotine use    7)  Fecal incontinence:  --new symptom  --s/p SNS 10/2023 Sera  --to keep diary    8)  Someone will call you to discuss dates and set up testing/consents.     20 minutes were spent in face to face time with this patient  100 % of this time was spent in counseling and/or coordination of care     Carrol Barton MD  [facility]  Division of Female Pelvic Medicine and Reconstructive Surgery  Department of Obstetrics & Gynecology

## 2024-01-23 ENCOUNTER — TELEPHONE (OUTPATIENT)
Dept: UROGYNECOLOGY | Facility: CLINIC | Age: 59
End: 2024-01-23
Payer: COMMERCIAL

## 2024-01-25 ENCOUNTER — TELEPHONE (OUTPATIENT)
Dept: UROGYNECOLOGY | Facility: CLINIC | Age: 59
End: 2024-01-25
Payer: COMMERCIAL

## 2024-01-25 DIAGNOSIS — Z01.818 PREOP TESTING: Primary | ICD-10-CM

## 2024-01-25 DIAGNOSIS — N99.3 VAGINAL VAULT PROLAPSE, POSTHYSTERECTOMY: Primary | ICD-10-CM

## 2024-01-25 DIAGNOSIS — N39.46 URINARY INCONTINENCE, MIXED: ICD-10-CM

## 2024-02-29 ENCOUNTER — ANESTHESIA EVENT (OUTPATIENT)
Dept: SURGERY | Facility: OTHER | Age: 59
End: 2024-02-29
Payer: COMMERCIAL

## 2024-02-29 NOTE — PROGRESS NOTES
Urogyn follow up    [facility] - UROGYNECOLOGY  [address] [id number]    Charla Sharif  [id number]  1965      Charla Sharif is a 59 y.o. here for preop.     Last HPI from 09/16/2022  1)  UI:  (+) BECKY > (+) UUI  X 1 year.  (+) pads (for fecal issues, no urine on pads).  Daytime frequency: Q 1 hours.  Nocturia: Yes: 2/night.   (+) dysuria,  (--) hematuria,  (--) frequent UTIs.  (--) complete bladder emptying. +PV dribbling. DV with variable 2nd amount.      2)  POP:  Present x 1-2 months. To introitus.  Symptoms:(+)  pressure.  (--) vaginal bleeding. (--) vaginal discharge. (+) sexually active.  (--) dyspareunia.  (+)  Vaginal dryness. (+) vaginal estrogen use--uses 0.5 3x/week--not consistent.    POP-Q:  Aa 0; Ba 0; C -8; Ap 0; Bp 0.  Genital hiatus 4, perineal body 2, total vaginal length 10.   Pvr 40 mL    3)  BM:  (+) constipation/straining x several months since rectal prolapse. Was not constipated before that. Taking linzess-not sure is helping. Has BM Q4 days.   (--) chronic diarrhea. (--) hematochezia.  + fecal incontinence (2x/week).  (--) fecal smearing/urgency.  (--) complete evacuation.  NO splinting.   --2 PPD for fecal smearing   --mucus discharge+  --rectal prolapse x 2 months  --8/2022 dynamic MRI:  Impression:  1.  Anatomic findings: Postop change of hysterectomy.  2.  Functional evaluation: Normal anorectal junction location and widened levator hiatus width at rest with mild/Grade 1 (2-4 cm) descent and mild/Grade 1 (6-8 cm) widening during defecation.  3.  Anterior compartment findings: Grade 1 cystocele.  4.  Middle compartment findings: Normal.  5.  Posterior compartment findings: Grade 1 peritoneocele and moderate rectocele.    12/23/2022 (virtual)  1) Rectal prolapse:  --still feeling some tissue bulging at anal area, marlyn with more activities and straining with BMs  --now having some FI and mucus discharge     2)  Vaginal atrophy (dryness):  using  vaginal estrogen 2x/week     3)  Mixed urinary incontinence, urge < stress:    --still having AMOS, may be worse     4)  constipation/straining with stools:  --taking trulance daily + miralax PRN  --certain foods trigger  --has not started daily fiber  --did not start PT     5)  Stage 2 cystocele/rectocele, stage 1 vaginal vault prolapse:  --still bothersome    2023  1) Rectal prolapse:  --still feeling some tissue bulging at anal area, marlyn with more activities and straining with BMs              --still feels something coming out--worries about rectal prolapse  --now having some FI and mucus discharge  --saw Dr. Zamora ([facility]) 2023:  - No evidence of full thickness rectal prolapse  - We discussed risks, benefits and alternatives of excisional hemorrhoidectomy.  We discussed postoperative pain expectations, risk of bleeding, and infection.  Consent obtained     2022 MR perez:  Impression:  1.  Anatomic findings: Postop change of hysterectomy.  2.  Functional evaluation: Normal anorectal junction location and widened levator hiatus width at rest with mild/Grade 1 (2-4 cm) descent and mild/Grade 1 (6-8 cm) widening during defecation.  3.  Anterior compartment findings: Grade 1 cystocele.  4.  Middle compartment findings: Normal.  5.  Posterior compartment findings: Grade 1 peritoneocele and moderate rectocele.     2)  Vaginal atrophy (dryness):  using vaginal estrogen 2x/week     3)  Mixed urinary incontinence, urge < stress:    --still having AMOS, may be worse; 1 PPD, min wetness  --never did UDS     4)  constipation/straining with stools:  --taking amitiza 24 mcg daily +high fiber diet + 1 fiber pill/day + miralax PRN (not weekly)              --controlled; denies significant straining  --not taking narcotics  --takes xanaflex for back pain; has some residual numbness--takes gabapentin  --certain foods trigger  --did not start PT--had back surgery + father      5)  Stage 2 cystocele/rectocele, stage 1 vaginal  vault prolapse:  --feels worse than last visit     6)  Fecal incontinence:  --new since last visit with CRS  --says goes through a few diapers a day    2024  1) Rectal prolapse:  --no s/sx     2)  Vaginal atrophy (dryness):  using vaginal estrogen 2x/week--sometimes forgets     3)  Mixed urinary incontinence, urge < stress:    --last visit: still having AMOS, may be worse; 1 PPD, min wetness  --today: BECKY>UUI; 3-4 PPD, min wetness unless accident; nocturia 4-5x/PM.   --never did UDS  --did 2 PFPT sessions; embarrassed; does practice on own     4)  constipation/straining with stools:  --no major straining s/p SNS + daily fiber pills  --was taking amitiza 24 mcg daily +high fiber diet + 1 fiber pill/day + miralax PRN (not weekly)  --not taking narcotics  --takes xanaflex for back pain; has some residual numbness--takes gabapentin  --certain foods trigger  --did not start PT--had back surgery + father      5)  Stage 2 cystocele/rectocele, stage 1 vaginal vault prolapse:  --feels worse than last visit  --still bothersome     6)  Fecal incontinence:  --s/p SNS 10/2023 [facility]  --says goes through a few diapers a day     7)  Smoking:  --stopped years ago  --stopped vaping 3-4 months ago; vapes once/month if stressed    Changes from last visit:  +BECKY > + uui--changing underwear 3-4 times daily.  Voiding every 1 hour during the day and 3-4/ night-- does not limit fluids 2 hours prior  Bowels are improved with interstim    2024  Suds  3.  URETHRAL FUNCTION/STORAGE PHASE:     a.  WITH prolapse reduction:  CLPP (150 mL): Negative  at  170 cm H20  VLPP (150 mL): Negative  at  109 cm H20   CLPP (300 mL): Negative  at  179 cm H20  VLPP (300 mL): Negative  at  128 cm H20   After pves catheter was removed, POS VLPP but NEG CLPP.      These findings are consistent with Positive urodynamic stress incontinence only with valsalva after removal of pves catheter.     Assessment:  UF with insufficient volume for interpretation.   PF prolonged.  Compliance normal.  Max capacity 441 mL.  DO (--).  BECKY (+).    Cysto  normal    Past Medical History:   Diagnosis Date    ADD (attention deficit disorder)     Anxiety     Hypertension    HTN  Sciatica: takes gabapentin + mobic daily     Past Surgical History:   Procedure Laterality Date    BACK SURGERY      CHOLECYSTECTOMY      HYSTERECTOMY      INSERTION, NEUROSTIMULATOR, SACRAL N/A 10/18/2023    Procedure: INSERTION, NEUROSTIMULATOR, SACRAL;  Surgeon: Adry Zamora MD;  Location: [facility] OR;  Service: Colon and Rectal;  Laterality: N/A;  vs. possible excision    INSERTION, NEUROSTIMULATOR, TEMPORARY, SACRAL N/A 10/4/2023    Procedure: INSERTION, NEUROSTIMULATOR, TEMPORARY, SACRAL;  Surgeon: Adry Zamora MD;  Location: [facility] OR;  Service: Colon and Rectal;  Laterality: N/A;   --discectomy (?lumbar) 3/2023  --[facility] esther  Hysterectomy: Yes   Date: 1994.  Indication: emergent for ovarian cyst rupture.    Type: xlap/Pfannenstiel  Cervix present: No  Ovaries present: No  Other procedures at time of hysterectomy:  none    No family history on file.    Social History     Socioeconomic History    Marital status: Unknown   Tobacco Use    Smoking status: Never    Smokeless tobacco: Never   Substance and Sexual Activity    Alcohol use: Not Currently    Drug use: Not Currently     Types: Benzodiazepines    Sexual activity: Not Currently       Current Outpatient Medications   Medication Sig Dispense Refill    amLODIPine (NORVASC) 5 MG tablet Take 5 mg by mouth once daily.      ARIPiprazole (ABILIFY) 2 MG Tab Take 2 mg by mouth once daily.      atomoxetine (STRATTERA) 80 MG capsule Take 80 mg by mouth once daily.      bisacodyL (DULCOLAX) 5 mg EC tablet Take 5 mg by mouth daily as needed.      busPIRone (BUSPAR) 15 MG tablet TAKE 1/2 TABLET BY MOUTH 3 TIMES DAILY      busPIRone (BUSPAR) 15 MG tablet   TAKE 1/2 TABLET BY MOUTH 3 TIMES DAILY      busPIRone (BUSPAR) 7.5 MG tablet Take 7.5 mg by mouth 3 (three) times  daily.      busPIRone (BUSPAR) 7.5 MG tablet Take 7.5 mg by mouth.      clonazePAM (KLONOPIN) 0.5 MG tablet Take 0.5 mg by mouth 2 (two) times daily as needed for Anxiety.      clonazePAM (KLONOPIN) 0.5 MG tablet       enalapril (VASOTEC) 20 MG tablet Take 1 tablet by mouth 2 (two) times daily.      escitalopram oxalate (LEXAPRO ORAL)       EScitalopram oxalate (LEXAPRO) 20 MG tablet Take 20 mg by mouth once daily.      estradioL (ESTRACE) 0.01 % (0.1 mg/gram) vaginal cream 0.5 grams with applicator or dime-sized amount with finger in vagina nightly x 2 weeks, then twice a week thereafter 42.5 g 11    estradioL (ESTRACE) 0.01 % (0.1 mg/gram) vaginal cream 0.5 grams with applicator or dime-sized amount with finger in vagina nightly x 2 weeks, then twice a week thereafter 42.5 g 11    gabapentin (NEURONTIN) 300 MG capsule TAKE 1 CAPSULE BY MOUTH 3 TIMES DAILY      lubiprostone (AMITIZA) 24 MCG Cap Take by mouth 2 (two) times daily with meals.      meloxicam (MOBIC) 15 MG tablet Take 15 mg by mouth every morning.      tiZANidine (ZANAFLEX) 4 MG tablet Take 4 mg by mouth every 8 (eight) hours as needed.      trospium (SANCTURA XR) 60 mg Cp24 capsule Take 1 capsule (60 mg total) by mouth once daily. (Patient not taking: Reported on 3/1/2024) 30 capsule 11     No current facility-administered medications for this visit.     Facility-Administered Medications Ordered in Other Visits   Medication Dose Route Frequency Provider Last Rate Last Admin    0.9%  NaCl infusion   Intravenous Continuous Gracie Jacobs NP        lactated ringers infusion   Intravenous Continuous Tigre Casanova MD        LIDOcaine (PF) 10 mg/ml (1%) injection 10 mg  1 mL Intradermal Once Gracie Jacobs NP        mupirocin 2 % ointment   Nasal On Call Procedure Gracie Jacobs NP   Given at 10/04/23 1020       Review of patient's allergies indicates:   Allergen Reactions    Promethazine Other (See Comments)     sweats       Well  woman:  Pap test: post IVA.  History of abnormal paps: No.  History of STIs:  No  Mammogram: Date of last: 2023 (Lynn).  Result: Normal per report.  Colonoscopy: Date of last: 2021 (Lynn).  Result:  normal per report.  Repeat due:  per CRS/PCP.    DEXA:  none:    ROS:  As per HPI.      Exam  There were no vitals taken for this visit.  General: alert and oriented, no acute distress  Respiratory: normal respiratory effort  Abd: soft, non-tender, non-distended    Pelvic--deferred    Impression  1. Preoperative exam for gynecologic surgery        2. Cystocele, midline        3. Vaginal vault prolapse, posthysterectomy        4. Vaginal atrophy        5. Urinary incontinence, mixed        6. Rectocele, female          We reviewed the above issues and discussed options for short-term versus long-term management of her problems.   Plan:   Patient consented for robotic assisted laparoscopic sacrocolpopexy with synthetic mesh, possible uterosacral suspension, possible anterior/posterior repair with perineorrhaphy, possible laparotomy, placement of synthetic midurethral sling, and cystourethroscopy.   R/B/A reviewed. Specific risks reviewed include:  infection, bleeding, need for blood transfusion, damage to surrounding structures, anesthesia risks, death, heart attack, stroke, mesh erosion/extrusion, pain, dyspareunia, urinary retention, voiding dysfunction, urinary incontinence, exacerbation of urinary urge incontinence, and need for further surgeries.  We reviewed potential for failure of POP defect repair and need for future surgery, with no way of predicting risk.  She understands success rate of ASC approaches 85%.  Success rate of midurethral sling for BECKY was reviewed as 80-85%, and she understands that this will not necessarily impact other types of urinary incontinence.  Alternatives reviewed include: pessary/PT for POP and pessary/periurethral injections/PT/medication for BECKY.    Recheck apex  before surgery--if well-supported,may just need A&P repair.   T&S on DOS  Bring SNS control with you.Will need to turn off SNS before OR, then can turn back on postop.   Stopped vaping early 2023. No nicotine use per report since then.   Preoperative appointment with PCP or cardiology: Yes -cleared  VTE Prophylaxis:  heparin 5000 u SQ TID (1st dose 2hrs preop) + SCDs  Patient instructed on Magnesium citrate and chlorahexadine/dial soap prep to perform day before & AM of surgery.   Proceed to OR for above-mentioned procedure.    30 minutes were spent in face to face time with this patient  90 % of this time was spent in counseling and/or coordination of care    MALLORY Mendieta-BC  [facility]  Division of Female Pelvic Medicine and Reconstructive Surgery  Department of Obstetrics & Gynecology

## 2024-02-29 NOTE — ANESTHESIA PREPROCEDURE EVALUATION
02/29/2024  Charla Sharif is a 59 y.o., female.      Pre-op Assessment    I have reviewed the Patient Summary Reports.     I have reviewed the Nursing Notes. I have reviewed the NPO Status.   I have reviewed the Medications.     Review of Systems  Anesthesia Hx:  No problems with previous Anesthesia   History of prior surgery of interest to airway management or planning:  Previous anesthesia: General 10/2023 with general anesthesia.  Procedure performed at an [facility].      Airway issues documented on chart review include videolaryngoscope used     Denies Family Hx of Anesthesia complications.    Denies Personal Hx of Anesthesia complications.                    Social:  Former Smoker, No Alcohol Use       Hematology/Oncology:  Hematology Normal   Oncology Normal                                   Cardiovascular:  Exercise tolerance: good   Hypertension                                        Pulmonary:  Pulmonary Normal                       Renal/:  Renal/ Normal                 Hepatic/GI:  Hepatic/GI Normal                 Musculoskeletal:     Sciatica       Spine Disorders: lumbar            Neurological:  Neurology Normal                                      Endocrine:  Endocrine Normal            Psych:  Psychiatric History anxiety  ADD               Physical Exam  General: Well nourished and Cooperative    Airway:  Mallampati: I   Mouth Opening: Normal  TM Distance: Normal  Neck ROM: Normal ROM    Dental:  Intact        Anesthesia Plan  Type of Anesthesia, risks & benefits discussed:    Anesthesia Type: Gen ETT  Intra-op Monitoring Plan: Standard ASA Monitors  Post Op Pain Control Plan: multimodal analgesia  Induction:  IV  Airway Plan: Video  Informed Consent: Informed consent signed with the Patient and all parties understand the risks and agree with anesthesia plan.  All questions  answered.   ASA Score: 2  Day of Surgery Review of History & Physical: H&P Update referred to the surgeon/provider.  Anesthesia Plan Notes: Needs T&S    Normal labs 10/2023  Had GETA at [facility] 10/2023 x2 - [facility]    Ready For Surgery From Anesthesia Perspective.     .

## 2024-02-29 NOTE — H&P (VIEW-ONLY)
Urogyn follow up    [facility] - UROGYNECOLOGY  [address] [id number]    Cahrla Sharif  [id number]  1965      Charla Sharif is a 59 y.o. here for preop.     Last HPI from 09/16/2022  1)  UI:  (+) BECKY > (+) UUI  X 1 year.  (+) pads (for fecal issues, no urine on pads).  Daytime frequency: Q 1 hours.  Nocturia: Yes: 2/night.   (+) dysuria,  (--) hematuria,  (--) frequent UTIs.  (--) complete bladder emptying. +PV dribbling. DV with variable 2nd amount.      2)  POP:  Present x 1-2 months. To introitus.  Symptoms:(+)  pressure.  (--) vaginal bleeding. (--) vaginal discharge. (+) sexually active.  (--) dyspareunia.  (+)  Vaginal dryness. (+) vaginal estrogen use--uses 0.5 3x/week--not consistent.    POP-Q:  Aa 0; Ba 0; C -8; Ap 0; Bp 0.  Genital hiatus 4, perineal body 2, total vaginal length 10.   Pvr 40 mL    3)  BM:  (+) constipation/straining x several months since rectal prolapse. Was not constipated before that. Taking linzess-not sure is helping. Has BM Q4 days.   (--) chronic diarrhea. (--) hematochezia.  + fecal incontinence (2x/week).  (--) fecal smearing/urgency.  (--) complete evacuation.  NO splinting.   --2 PPD for fecal smearing   --mucus discharge+  --rectal prolapse x 2 months  --8/2022 dynamic MRI:  Impression:  1.  Anatomic findings: Postop change of hysterectomy.  2.  Functional evaluation: Normal anorectal junction location and widened levator hiatus width at rest with mild/Grade 1 (2-4 cm) descent and mild/Grade 1 (6-8 cm) widening during defecation.  3.  Anterior compartment findings: Grade 1 cystocele.  4.  Middle compartment findings: Normal.  5.  Posterior compartment findings: Grade 1 peritoneocele and moderate rectocele.    12/23/2022 (virtual)  1) Rectal prolapse:  --still feeling some tissue bulging at anal area, marlyn with more activities and straining with BMs  --now having some FI and mucus discharge     2)  Vaginal atrophy (dryness):  using  vaginal estrogen 2x/week     3)  Mixed urinary incontinence, urge < stress:    --still having AMOS, may be worse     4)  constipation/straining with stools:  --taking trulance daily + miralax PRN  --certain foods trigger  --has not started daily fiber  --did not start PT     5)  Stage 2 cystocele/rectocele, stage 1 vaginal vault prolapse:  --still bothersome    2023  1) Rectal prolapse:  --still feeling some tissue bulging at anal area, marlyn with more activities and straining with BMs              --still feels something coming out--worries about rectal prolapse  --now having some FI and mucus discharge  --saw Dr. Zamora ([facility]) 2023:  - No evidence of full thickness rectal prolapse  - We discussed risks, benefits and alternatives of excisional hemorrhoidectomy.  We discussed postoperative pain expectations, risk of bleeding, and infection.  Consent obtained     2022 MR perez:  Impression:  1.  Anatomic findings: Postop change of hysterectomy.  2.  Functional evaluation: Normal anorectal junction location and widened levator hiatus width at rest with mild/Grade 1 (2-4 cm) descent and mild/Grade 1 (6-8 cm) widening during defecation.  3.  Anterior compartment findings: Grade 1 cystocele.  4.  Middle compartment findings: Normal.  5.  Posterior compartment findings: Grade 1 peritoneocele and moderate rectocele.     2)  Vaginal atrophy (dryness):  using vaginal estrogen 2x/week     3)  Mixed urinary incontinence, urge < stress:    --still having AMOS, may be worse; 1 PPD, min wetness  --never did UDS     4)  constipation/straining with stools:  --taking amitiza 24 mcg daily +high fiber diet + 1 fiber pill/day + miralax PRN (not weekly)              --controlled; denies significant straining  --not taking narcotics  --takes xanaflex for back pain; has some residual numbness--takes gabapentin  --certain foods trigger  --did not start PT--had back surgery + father      5)  Stage 2 cystocele/rectocele, stage 1 vaginal  vault prolapse:  --feels worse than last visit     6)  Fecal incontinence:  --new since last visit with CRS  --says goes through a few diapers a day    2024  1) Rectal prolapse:  --no s/sx     2)  Vaginal atrophy (dryness):  using vaginal estrogen 2x/week--sometimes forgets     3)  Mixed urinary incontinence, urge < stress:    --last visit: still having AMOS, may be worse; 1 PPD, min wetness  --today: BECKY>UUI; 3-4 PPD, min wetness unless accident; nocturia 4-5x/PM.   --never did UDS  --did 2 PFPT sessions; embarrassed; does practice on own     4)  constipation/straining with stools:  --no major straining s/p SNS + daily fiber pills  --was taking amitiza 24 mcg daily +high fiber diet + 1 fiber pill/day + miralax PRN (not weekly)  --not taking narcotics  --takes xanaflex for back pain; has some residual numbness--takes gabapentin  --certain foods trigger  --did not start PT--had back surgery + father      5)  Stage 2 cystocele/rectocele, stage 1 vaginal vault prolapse:  --feels worse than last visit  --still bothersome     6)  Fecal incontinence:  --s/p SNS 10/2023 [facility]  --says goes through a few diapers a day     7)  Smoking:  --stopped years ago  --stopped vaping 3-4 months ago; vapes once/month if stressed    Changes from last visit:  +BECKY > + uui--changing underwear 3-4 times daily.  Voiding every 1 hour during the day and 3-4/ night-- does not limit fluids 2 hours prior  Bowels are improved with interstim    2024  Suds  3.  URETHRAL FUNCTION/STORAGE PHASE:     a.  WITH prolapse reduction:  CLPP (150 mL): Negative  at  170 cm H20  VLPP (150 mL): Negative  at  109 cm H20   CLPP (300 mL): Negative  at  179 cm H20  VLPP (300 mL): Negative  at  128 cm H20   After pves catheter was removed, POS VLPP but NEG CLPP.      These findings are consistent with Positive urodynamic stress incontinence only with valsalva after removal of pves catheter.     Assessment:  UF with insufficient volume for interpretation.   PF prolonged.  Compliance normal.  Max capacity 441 mL.  DO (--).  BECKY (+).    Cysto  normal    Past Medical History:   Diagnosis Date    ADD (attention deficit disorder)     Anxiety     Hypertension    HTN  Sciatica: takes gabapentin + mobic daily     Past Surgical History:   Procedure Laterality Date    BACK SURGERY      CHOLECYSTECTOMY      HYSTERECTOMY      INSERTION, NEUROSTIMULATOR, SACRAL N/A 10/18/2023    Procedure: INSERTION, NEUROSTIMULATOR, SACRAL;  Surgeon: Adry Zamora MD;  Location: [facility] OR;  Service: Colon and Rectal;  Laterality: N/A;  vs. possible excision    INSERTION, NEUROSTIMULATOR, TEMPORARY, SACRAL N/A 10/4/2023    Procedure: INSERTION, NEUROSTIMULATOR, TEMPORARY, SACRAL;  Surgeon: Adry Zamora MD;  Location: [facility] OR;  Service: Colon and Rectal;  Laterality: N/A;   --discectomy (?lumbar) 3/2023  --[facility] esther  Hysterectomy: Yes   Date: 1994.  Indication: emergent for ovarian cyst rupture.    Type: xlap/Pfannenstiel  Cervix present: No  Ovaries present: No  Other procedures at time of hysterectomy:  none    No family history on file.    Social History     Socioeconomic History    Marital status: Unknown   Tobacco Use    Smoking status: Never    Smokeless tobacco: Never   Substance and Sexual Activity    Alcohol use: Not Currently    Drug use: Not Currently     Types: Benzodiazepines    Sexual activity: Not Currently       Current Outpatient Medications   Medication Sig Dispense Refill    amLODIPine (NORVASC) 5 MG tablet Take 5 mg by mouth once daily.      ARIPiprazole (ABILIFY) 2 MG Tab Take 2 mg by mouth once daily.      atomoxetine (STRATTERA) 80 MG capsule Take 80 mg by mouth once daily.      bisacodyL (DULCOLAX) 5 mg EC tablet Take 5 mg by mouth daily as needed.      busPIRone (BUSPAR) 15 MG tablet TAKE 1/2 TABLET BY MOUTH 3 TIMES DAILY      busPIRone (BUSPAR) 15 MG tablet   TAKE 1/2 TABLET BY MOUTH 3 TIMES DAILY      busPIRone (BUSPAR) 7.5 MG tablet Take 7.5 mg by mouth 3 (three) times  daily.      busPIRone (BUSPAR) 7.5 MG tablet Take 7.5 mg by mouth.      clonazePAM (KLONOPIN) 0.5 MG tablet Take 0.5 mg by mouth 2 (two) times daily as needed for Anxiety.      clonazePAM (KLONOPIN) 0.5 MG tablet       enalapril (VASOTEC) 20 MG tablet Take 1 tablet by mouth 2 (two) times daily.      escitalopram oxalate (LEXAPRO ORAL)       EScitalopram oxalate (LEXAPRO) 20 MG tablet Take 20 mg by mouth once daily.      estradioL (ESTRACE) 0.01 % (0.1 mg/gram) vaginal cream 0.5 grams with applicator or dime-sized amount with finger in vagina nightly x 2 weeks, then twice a week thereafter 42.5 g 11    estradioL (ESTRACE) 0.01 % (0.1 mg/gram) vaginal cream 0.5 grams with applicator or dime-sized amount with finger in vagina nightly x 2 weeks, then twice a week thereafter 42.5 g 11    gabapentin (NEURONTIN) 300 MG capsule TAKE 1 CAPSULE BY MOUTH 3 TIMES DAILY      lubiprostone (AMITIZA) 24 MCG Cap Take by mouth 2 (two) times daily with meals.      meloxicam (MOBIC) 15 MG tablet Take 15 mg by mouth every morning.      tiZANidine (ZANAFLEX) 4 MG tablet Take 4 mg by mouth every 8 (eight) hours as needed.      trospium (SANCTURA XR) 60 mg Cp24 capsule Take 1 capsule (60 mg total) by mouth once daily. (Patient not taking: Reported on 3/1/2024) 30 capsule 11     No current facility-administered medications for this visit.     Facility-Administered Medications Ordered in Other Visits   Medication Dose Route Frequency Provider Last Rate Last Admin    0.9%  NaCl infusion   Intravenous Continuous Gracie Jacobs NP        lactated ringers infusion   Intravenous Continuous Tigre Casanova MD        LIDOcaine (PF) 10 mg/ml (1%) injection 10 mg  1 mL Intradermal Once Gracie Jacobs NP        mupirocin 2 % ointment   Nasal On Call Procedure Gracie Jacobs NP   Given at 10/04/23 1020       Review of patient's allergies indicates:   Allergen Reactions    Promethazine Other (See Comments)     sweats       Well  woman:  Pap test: post IVA.  History of abnormal paps: No.  History of STIs:  No  Mammogram: Date of last: 2023 ([place]).  Result: Normal per report.  Colonoscopy: Date of last: 2021 ([place]).  Result:  normal per report.  Repeat due:  per CRS/PCP.    DEXA:  none:    ROS:  As per HPI.      Exam  There were no vitals taken for this visit.  General: alert and oriented, no acute distress  Respiratory: normal respiratory effort  Abd: soft, non-tender, non-distended    Pelvic--deferred    Impression  1. Preoperative exam for gynecologic surgery        2. Cystocele, midline        3. Vaginal vault prolapse, posthysterectomy        4. Vaginal atrophy        5. Urinary incontinence, mixed        6. Rectocele, female          We reviewed the above issues and discussed options for short-term versus long-term management of her problems.   Plan:   Patient consented for robotic assisted laparoscopic sacrocolpopexy with synthetic mesh, possible uterosacral suspension, possible anterior/posterior repair with perineorrhaphy, possible laparotomy, placement of synthetic midurethral sling, and cystourethroscopy.   R/B/A reviewed. Specific risks reviewed include:  infection, bleeding, need for blood transfusion, damage to surrounding structures, anesthesia risks, death, heart attack, stroke, mesh erosion/extrusion, pain, dyspareunia, urinary retention, voiding dysfunction, urinary incontinence, exacerbation of urinary urge incontinence, and need for further surgeries.  We reviewed potential for failure of POP defect repair and need for future surgery, with no way of predicting risk.  She understands success rate of ASC approaches 85%.  Success rate of midurethral sling for BECKY was reviewed as 80-85%, and she understands that this will not necessarily impact other types of urinary incontinence.  Alternatives reviewed include: pessary/PT for POP and pessary/periurethral injections/PT/medication for BECKY.    Recheck apex  before surgery--if well-supported,may just need A&P repair.   T&S on DOS  Bring SNS control with you.Will need to turn off SNS before OR, then can turn back on postop.   Stopped vaping early 2023. No nicotine use per report since then.   Preoperative appointment with PCP or cardiology: Yes -cleared  VTE Prophylaxis:  heparin 5000 u SQ TID (1st dose 2hrs preop) + SCDs  Patient instructed on Magnesium citrate and chlorahexadine/dial soap prep to perform day before & AM of surgery.   Proceed to OR for above-mentioned procedure.    30 minutes were spent in face to face time with this patient  90 % of this time was spent in counseling and/or coordination of care    MALLORY Mendieta-BC  [facility]  Division of Female Pelvic Medicine and Reconstructive Surgery  Department of Obstetrics & Gynecology

## 2024-03-01 ENCOUNTER — TELEPHONE (OUTPATIENT)
Dept: UROGYNECOLOGY | Facility: CLINIC | Age: 59
End: 2024-03-01
Payer: COMMERCIAL

## 2024-03-01 ENCOUNTER — PROCEDURE VISIT (OUTPATIENT)
Dept: UROGYNECOLOGY | Facility: CLINIC | Age: 59
End: 2024-03-01
Payer: COMMERCIAL

## 2024-03-01 ENCOUNTER — HOSPITAL ENCOUNTER (OUTPATIENT)
Dept: PREADMISSION TESTING | Facility: OTHER | Age: 59
Discharge: HOME | End: 2024-03-01
Attending: OBSTETRICS & GYNECOLOGY
Payer: COMMERCIAL

## 2024-03-01 ENCOUNTER — OFFICE VISIT (OUTPATIENT)
Dept: UROGYNECOLOGY | Facility: CLINIC | Age: 59
End: 2024-03-01
Payer: COMMERCIAL

## 2024-03-01 VITALS
SYSTOLIC BLOOD PRESSURE: 123 MMHG | HEART RATE: 70 BPM | DIASTOLIC BLOOD PRESSURE: 61 MMHG | TEMPERATURE: 97 F | HEIGHT: 62 IN | WEIGHT: 144 LBS | OXYGEN SATURATION: 98 % | RESPIRATION RATE: 16 BRPM | BODY MASS INDEX: 26.5 KG/M2

## 2024-03-01 VITALS
SYSTOLIC BLOOD PRESSURE: 120 MMHG | DIASTOLIC BLOOD PRESSURE: 74 MMHG | HEIGHT: 62 IN | BODY MASS INDEX: 26.53 KG/M2 | WEIGHT: 144.19 LBS

## 2024-03-01 DIAGNOSIS — N99.3 VAGINAL VAULT PROLAPSE, POSTHYSTERECTOMY: ICD-10-CM

## 2024-03-01 DIAGNOSIS — N95.2 VAGINAL ATROPHY: ICD-10-CM

## 2024-03-01 DIAGNOSIS — N81.11 CYSTOCELE, MIDLINE: ICD-10-CM

## 2024-03-01 DIAGNOSIS — N81.6 RECTOCELE, FEMALE: ICD-10-CM

## 2024-03-01 DIAGNOSIS — Z01.818 PREOPERATIVE EXAM FOR GYNECOLOGIC SURGERY: Primary | ICD-10-CM

## 2024-03-01 DIAGNOSIS — N39.46 URINARY INCONTINENCE, MIXED: ICD-10-CM

## 2024-03-01 DIAGNOSIS — Z01.818 PREOP TESTING: ICD-10-CM

## 2024-03-01 LAB
ABO + RH BLD: NORMAL
ALBUMIN SERPL BCP-MCNC: 3.9 G/DL (ref 3.5–5.2)
ALP SERPL-CCNC: 77 U/L (ref 55–135)
ALT SERPL W/O P-5'-P-CCNC: 20 U/L (ref 10–44)
ANION GAP SERPL CALC-SCNC: 7 MMOL/L (ref 8–16)
AST SERPL-CCNC: 17 U/L (ref 10–40)
BASOPHILS # BLD AUTO: 0.04 K/UL (ref 0–0.2)
BASOPHILS NFR BLD: 0.4 % (ref 0–1.9)
BILIRUB SERPL-MCNC: 0.5 MG/DL (ref 0.1–1)
BILIRUB SERPL-MCNC: NORMAL MG/DL
BLD GP AB SCN CELLS X3 SERPL QL: NORMAL
BLOOD URINE, POC: NORMAL
BUN SERPL-MCNC: 13 MG/DL (ref 6–20)
CALCIUM SERPL-MCNC: 9.3 MG/DL (ref 8.7–10.5)
CHLORIDE SERPL-SCNC: 107 MMOL/L (ref 95–110)
CLARITY, POC UA: CLEAR
CO2 SERPL-SCNC: 26 MMOL/L (ref 23–29)
COLOR, POC UA: NORMAL
CREAT SERPL-MCNC: 0.7 MG/DL (ref 0.5–1.4)
DIFFERENTIAL METHOD BLD: ABNORMAL
EOSINOPHIL # BLD AUTO: 0.1 K/UL (ref 0–0.5)
EOSINOPHIL NFR BLD: 1.6 % (ref 0–8)
ERYTHROCYTE [DISTWIDTH] IN BLOOD BY AUTOMATED COUNT: 11.7 % (ref 11.5–14.5)
EST. GFR  (NO RACE VARIABLE): >60 ML/MIN/1.73 M^2
GLUCOSE SERPL-MCNC: 85 MG/DL (ref 70–110)
GLUCOSE UR QL STRIP: NORMAL
HCT VFR BLD AUTO: 40.2 % (ref 37–48.5)
HGB BLD-MCNC: 13.6 G/DL (ref 12–16)
IMM GRANULOCYTES # BLD AUTO: 0.03 K/UL (ref 0–0.04)
IMM GRANULOCYTES NFR BLD AUTO: 0.3 % (ref 0–0.5)
KETONES UR QL STRIP: NORMAL
LEUKOCYTE ESTERASE URINE, POC: NORMAL
LYMPHOCYTES # BLD AUTO: 2.6 K/UL (ref 1–4.8)
LYMPHOCYTES NFR BLD: 29.4 % (ref 18–48)
MCH RBC QN AUTO: 33.1 PG (ref 27–31)
MCHC RBC AUTO-ENTMCNC: 33.8 G/DL (ref 32–36)
MCV RBC AUTO: 98 FL (ref 82–98)
MONOCYTES # BLD AUTO: 0.7 K/UL (ref 0.3–1)
MONOCYTES NFR BLD: 7.7 % (ref 4–15)
NEUTROPHILS # BLD AUTO: 5.4 K/UL (ref 1.8–7.7)
NEUTROPHILS NFR BLD: 60.6 % (ref 38–73)
NITRITE, POC UA: NORMAL
NRBC BLD-RTO: 0 /100 WBC
PH, POC UA: 5
PLATELET # BLD AUTO: 291 K/UL (ref 150–450)
PMV BLD AUTO: 10.5 FL (ref 9.2–12.9)
POTASSIUM SERPL-SCNC: 4.5 MMOL/L (ref 3.5–5.1)
PROT SERPL-MCNC: 6.7 G/DL (ref 6–8.4)
PROTEIN, POC: NORMAL
RBC # BLD AUTO: 4.11 M/UL (ref 4–5.4)
SODIUM SERPL-SCNC: 140 MMOL/L (ref 136–145)
SPECIFIC GRAVITY, POC UA: 1.01
SPECIMEN OUTDATE: NORMAL
UROBILINOGEN, POC UA: NORMAL
WBC # BLD AUTO: 8.92 K/UL (ref 3.9–12.7)

## 2024-03-01 PROCEDURE — 51728 CYSTOMETROGRAM W/VP: CPT | Mod: S$GLB,,, | Performed by: OBSTETRICS & GYNECOLOGY

## 2024-03-01 PROCEDURE — 80053 COMPREHEN METABOLIC PANEL: CPT | Performed by: NURSE PRACTITIONER

## 2024-03-01 PROCEDURE — 99499 UNLISTED E&M SERVICE: CPT | Mod: S$GLB,,, | Performed by: NURSE PRACTITIONER

## 2024-03-01 PROCEDURE — 99999 PR PBB SHADOW E&M-EST. PATIENT-LVL II: CPT | Mod: PBBFAC,,, | Performed by: NURSE PRACTITIONER

## 2024-03-01 PROCEDURE — 36415 COLL VENOUS BLD VENIPUNCTURE: CPT | Performed by: NURSE PRACTITIONER

## 2024-03-01 PROCEDURE — 52000 CYSTOURETHROSCOPY: CPT | Mod: 59,S$GLB,, | Performed by: OBSTETRICS & GYNECOLOGY

## 2024-03-01 PROCEDURE — 81002 URINALYSIS NONAUTO W/O SCOPE: CPT | Mod: S$GLB,,, | Performed by: OBSTETRICS & GYNECOLOGY

## 2024-03-01 PROCEDURE — 51784 ANAL/URINARY MUSCLE STUDY: CPT | Mod: 51,S$GLB,, | Performed by: OBSTETRICS & GYNECOLOGY

## 2024-03-01 PROCEDURE — 51741 ELECTRO-UROFLOWMETRY FIRST: CPT | Mod: 51,S$GLB,, | Performed by: OBSTETRICS & GYNECOLOGY

## 2024-03-01 PROCEDURE — 85025 COMPLETE CBC W/AUTO DIFF WBC: CPT | Performed by: NURSE PRACTITIONER

## 2024-03-01 PROCEDURE — 51797 INTRAABDOMINAL PRESSURE TEST: CPT | Mod: S$GLB,,, | Performed by: OBSTETRICS & GYNECOLOGY

## 2024-03-01 PROCEDURE — 86850 RBC ANTIBODY SCREEN: CPT | Performed by: NURSE PRACTITIONER

## 2024-03-01 RX ORDER — CIPROFLOXACIN 500 MG/1
500 TABLET ORAL
Status: DISCONTINUED | OUTPATIENT
Start: 2024-03-01 | End: 2024-03-01

## 2024-03-01 RX ORDER — TRAZODONE HYDROCHLORIDE 50 MG/1
50 TABLET ORAL NIGHTLY PRN
COMMUNITY

## 2024-03-01 RX ORDER — ACETAMINOPHEN 500 MG
1000 TABLET ORAL
Status: CANCELLED | OUTPATIENT
Start: 2024-03-01 | End: 2024-03-01

## 2024-03-01 RX ORDER — LIDOCAINE HYDROCHLORIDE 20 MG/ML
JELLY TOPICAL ONCE
Status: DISCONTINUED | OUTPATIENT
Start: 2024-03-01 | End: 2024-03-01

## 2024-03-01 RX ORDER — SODIUM CHLORIDE, SODIUM LACTATE, POTASSIUM CHLORIDE, CALCIUM CHLORIDE 600; 310; 30; 20 MG/100ML; MG/100ML; MG/100ML; MG/100ML
INJECTION, SOLUTION INTRAVENOUS CONTINUOUS
Status: CANCELLED | OUTPATIENT
Start: 2024-03-01

## 2024-03-01 RX ADMIN — LIDOCAINE HYDROCHLORIDE 5 ML: 20 JELLY TOPICAL at 09:03

## 2024-03-01 RX ADMIN — CIPROFLOXACIN 500 MG: 500 TABLET ORAL at 09:03

## 2024-03-01 NOTE — PROGRESS NOTES
TITLE OF OPERATION:  Complex cystometry.  Complex uroflowmetry.  Electromyography with surface electrodes.  Pressure voiding flow study.  Abdominal pressure measurement.  Leak point pressure measurement.    INDICATIONS:  Charla Sharif is a 58 y.o. here for a urogyn follow up. The patient's last visit with me was on 12/23/2022 (telemed).         1)  UI:  (+) BECKY > (+) UUI  X 1 year.  (+) pads (for fecal issues, no urine on pads).  Daytime frequency: Q 1 hours.  Nocturia: Yes: 2/night.   (+) dysuria,  (--) hematuria,  (--) frequent UTIs.  (--) complete bladder emptying. +PV dribbling. DV with variable 2nd amount.      2)  POP:  Present x 1-2 months. To introitus.  Symptoms:(+)  pressure.  (--) vaginal bleeding. (--) vaginal discharge. (+) sexually active.  (--) dyspareunia.  (+)  Vaginal dryness. (+) vaginal estrogen use--uses 0.5 3x/week--not consistent.      3)  BM:  (+) constipation/straining x several months since rectal prolapse. Was not constipated before that. Taking linzess-not sure is helping. Has BM Q4 days.   (--) chronic diarrhea. (--) hematochezia.  + fecal incontinence (2x/week).  (--) fecal smearing/urgency.  (--) complete evacuation.  NO splinting.   --2 PPD for fecal smearing   --mucus discharge+  --rectal prolapse x 2 months  --8/2022 dynamic MRI:  Impression:  1.  Anatomic findings: Postop change of hysterectomy.  2.  Functional evaluation: Normal anorectal junction location and widened levator hiatus width at rest with mild/Grade 1 (2-4 cm) descent and mild/Grade 1 (6-8 cm) widening during defecation.  3.  Anterior compartment findings: Grade 1 cystocele.  4.  Middle compartment findings: Normal.  5.  Posterior compartment findings: Grade 1 peritoneocele and moderate rectocele.     --initial exam:  PELVIC:    External genitalia:  Normal Bartholins, Skenes and labia bilaterally.    Urethra:  No caruncle, diverticulum or masses.  (+) hypermobility.    Vagina:  Atrophy (+) , no bladder masses or tender,  no discharge.  +TTP LV B.  Cervix:  absent  Uterus: uterus absent  Adnexa: Not palpable  POP-Q:  Aa 0; Ba 0; C -8; Ap 0; Bp 0.  Genital hiatus 4, perineal body 2, total vaginal length 10.    History since last visit:   1) Rectal prolapse:  --still feeling some tissue bulging at anal area, marlyn with more activities and straining with BMs              --still feels something coming out--worries about rectal prolapse  --now having some FI and mucus discharge  --saw Dr. Zamora ([facility]) 2023:  - No evidence of full thickness rectal prolapse  - We discussed risks, benefits and alternatives of excisional hemorrhoidectomy.  We discussed postoperative pain expectations, risk of bleeding, and infection.  Consent obtained     2022 MR perez:  Impression:  1.  Anatomic findings: Postop change of hysterectomy.  2.  Functional evaluation: Normal anorectal junction location and widened levator hiatus width at rest with mild/Grade 1 (2-4 cm) descent and mild/Grade 1 (6-8 cm) widening during defecation.  3.  Anterior compartment findings: Grade 1 cystocele.  4.  Middle compartment findings: Normal.  5.  Posterior compartment findings: Grade 1 peritoneocele and moderate rectocele.     2)  Vaginal atrophy (dryness):  using vaginal estrogen 2x/week     3)  Mixed urinary incontinence, urge < stress:    --still having AMOS, may be worse; 1 PPD, min wetness  --never did UDS     4)  constipation/straining with stools:  --taking amitiza 24 mcg daily +high fiber diet + 1 fiber pill/day + miralax PRN (not weekly)              --controlled; denies significant straining  --not taking narcotics  --takes xanaflex for back pain; has some residual numbness--takes gabapentin  --certain foods trigger  --did not start PT--had back surgery + father      5)  Stage 2 cystocele/rectocele, stage 1 vaginal vault prolapse:  --feels worse than last visit  --POP-Q:  Aa 0; Ba 0; C -5; Ap 0; Bp 0.  Genital hiatus 4, perineal body 2, total vaginal length 10  (standing).      6)  Fecal incontinence:  --s/p SNS per CRS 10/18/2023  --says goes through a few diapers a day      PREOPERATIVE DIAGNOSIS:  1. Urinary incontinence, mixed    2. Vaginal vault prolapse, posthysterectomy    3. Cystocele, midline    4. Rectocele, female    5. Internal prolapsed hemorrhoids    6. Myalgia of pelvic floor    7. Straining with stools    8. Vaginal atrophy    9. Incontinence of feces, unspecified fecal incontinence type      POSTOPERATIVE DIAGNOSIS:  1. Urinary incontinence, mixed    2. Vaginal vault prolapse, posthysterectomy    3. Cystocele, midline    4. Rectocele, female    5. Internal prolapsed hemorrhoids    6. Myalgia of pelvic floor    7. Straining with stools    8. Vaginal atrophy    9. Incontinence of feces, unspecified fecal incontinence type    10. Slow stream  11.  Urodynamic stress incontinence    ANESTHESIA:  None.    SPECIMEN (BACTERIOLOGICAL, PATHOLOGICAL OR OTHER):  None.    PROSTHETIC DEVICE/IMPLANT:  None.    SURGEONS NARRATIVE:  A time out was performed in which the patient identity and procedure were confirmed.  Urodynamic evaluation was performed using a computerized system ([employer], [employer]  Uroflowmetry was performed on the patient in the sitting position without catheters in place.  Subsequent urodynamic testing was performed with the patient in the lithotomy position at 45 degrees. Air charged catheters were used with sterile water as the infusion medium. Vesical and abdominal (rectal) pressures were measured, and detrusor pressure was calculated. EMG activity was recorded with surface electrodes. During filling, room temperature sterile water was infused at a rate of 30 cubic centimeters per minute. The patient was asked cough after instillation of each 100cc volume. Two Valsalva leak point pressures and two cough leak point pressures were performed with the catheters in place at 300 cubic centimeters and again at maximum capacity. Valsalva leak  point pressure was defined as the difference between vesical pressure at which leakage was noted (visualized at the external urethral meatus) and the baseline vesical pressure. Following urodynamic testing, a pressure flow study was performed with the patient in the sitting position. Vesical and abdominal pressures were monitored and detrusor pressures were calculated. After the pressure flow study, the catheters were then removed. The patient tolerated the procedure well.     Urine dipstick: neg.    1.  VOIDING PHASE:      a.  Uroflowmetry:  Prolapse reduction: No  Voided volume:  35 mL   Voiding time:   23 seconds  Max flow:  6 mL/s  Avg flow:   4 mL/s   PVR:   5 mL    The overall configuration of this uroflow study was  with insufficient volume for interpretation.      b.  Pressure flow:  Prolapse reduction: No  Voided volume:  441 mL  Voiding time:   > 5 min   Peak flow:  10 mL/s   Avg flow:  4 mL/s  Max det pressure:  52  cm H20  Det pressure at max flow: 38 cm H20  Void initiated by detrusor contraction.    Urethral relaxation (EMG):  present.    PVR (calculated):  0 mL    The overall configuration of this pressure flow study was prolonged.      2.  FILLING PHASE:  1st desire: 120 mL  Normal desire:  261 mL  Strong desire:  300 mL  Urgency:  385 mL  Compliance (calculated)  90 mL/cm H20  EMG activity during filling:  stable  Detrusor contractions observed: No.     3.  URETHRAL FUNCTION/STORAGE PHASE:    a.  WITH prolapse reduction:  CLPP (150 mL): Negative  at  170 cm H20  VLPP (150 mL): Negative  at  109 cm H20   CLPP (300 mL): Negative  at  179 cm H20  VLPP (300 mL): Negative  at  128 cm H20   After pves catheter was removed, POS VLPP but NEG CLPP.     These findings are consistent with Positive urodynamic stress incontinence only with valsalva after removal of pves catheter.    Assessment:  UF with insufficient volume for interpretation.  PF prolonged.  Compliance normal.  Max capacity 441 mL.  DO (--).   BECKY (+).      Plan:    1) Hemorrhoids with FI:  --management per Dr. Zamora in CRS              --MR perez 2022 NEG              --has not been able to reproduce in office or per photo--none today on exam              --hemorrhoids+   --s/p SNS with good control of symptoms, including FI--please bring control to turn off before surgery  --will work to minimize straining with BMs (see below)     2)  Vaginal atrophy (dryness):  Use 0.5 gram of estrogen cream in vagina at night twice a week.   --will help general discomfort and drynes     3)  Mixed urinary incontinence, urge < stress:    --Empty bladder every 3 hours.  Empty well: wait a minute, lean forward on toilet.    --Avoid dietary irritants (see sheet).  Keep diary x 3-5 days to determine your irritants.  --start pelvic floor PT  --URGE: consider medication in future. Takes 2-4 weeks to see if will have effect.  For dry mouth: get sour, sugar free lozenge or gum.    --STRESS:  Pessary vs. Sling.               --UDS +BECKY--add MUS     4)  constipation/straining with stools:  --continue taking amitiza 24 mcg daily +high fiber diet + 1 fiber pill/day (may need to increase to 2-3 pills/day) + miralax PRN (not weekly)  --hydrate well  --start fiber supplement  --would start pelvic floor PT to work on minimizing straining with BMs; also needs plan to optimize constipation control (taking 290 mcg linzess with ? Improvement)--may be impacted by rectal prolapse                          --call to make appt:  Eula Jurado  [facility]  [address], [address]  [place], [place]  p) [phone]        5)  Stage 2 cystocele/rectocele, stage 1 vaginal vault prolapse:  --discussed  --options: observation vs pessary/PT (won't get rid of bulge but can help pressure) vs surgery              --if surgery: robotic/laparoscopic sacral colpopexy vs uterosacral suspension (would lean toward ASC)              --if surgery: UDS+BECKY--add MUS               --if surgery: needs clearance per PCP + labs (CBC, CMP, T&S)/EKG              --if surgery: would start pelvic floor PT to work on minimizing straining with BMs; also needs plan to optimize constipation control (taking 290 mcg linzess with ? Improvement)--may be impacted by rectal prolapse; please call to make appt  Eula Jurado  [facility]  [address], [address]  [place], [place]  (p) [phone]     6)  Vapes occasionally:  --last vape early 2023; no cigarette smoking  -advised needs to be smoke-free to do MUS at time of surgery     7)  Fecal incontinence:  --new symptom  --management per CRS  --to keep diary     8)  See preop note from 2- for full detail.      ------------------------------------------------    Title of Operation:   Cystourethroscopy.     INDICATIONS:  As above    PREOPERATIVE DIAGNOSIS  As above    POSTOPERATIVE DIAGNOSIS:   As above    Anesthesia:   2% Xylocaine gel.    Specimen (Bacteriological, Pathological or other):   None.     Prosthetic Device/Implant:   None.     Surgeons Narrative:     After informed consent was obtained, the patient was placed in the lithotomy position. The urethral meatus was prepped with Betadine and 10 cubic centimeters of 2% Xylocaine gel were introduced into the urethra. A flexible cystourethroscope was introduced into the bladder. The bladder was distended with approximately 300 cubic centimeters of sterile water. A systematic survey was performed in which the bladder was surveyed using multiple sequential passes in a clockwise fashion from the bladder dome to the bladder base to the urethrovesical junction. The trigone and ureteral orifices were observed. The scope was then flipped back on itself, and the urethrovesical junction was viewed. A vaginal examining finger was then placed with pressure suburethrally at the urethrovesical junction as the telescope was withdrawn in order to perform positive pressure  urethroscopy.  Standard maneuvers of cough, squeeze and Valsalva were performed. The telescope was then completely withdrawn.     Findings: Urethroscopy:  Normal.  Cystoscopy:  Normal bladder mucosa, bilateral ureteral flow was noted.     Assessment: Essentially normal cystourethroscopy.      Plan: The patient will follow up with Dr. Barton as scheduled.  See urodynamics note from 2- for further plan details.

## 2024-03-01 NOTE — PRE ADMISSION SCREENING
Patient will bring bowel stimulator remote DOS to turn stimulator off.     EKG order discontinued. Results in care everywhere on 2/12/2024.    Labs routed to Dr. Barton.

## 2024-03-01 NOTE — DISCHARGE INSTRUCTIONS
Information to Prepare you for your Surgery    PRE-ADMIT TESTING   [address] [facility]       Your surgery has been scheduled at [facility]. We are pleased to have the opportunity to serve you. For Further Information please call [phone].    On the day of surgery please report to the Information Desk on the 1st floor.    CONTACT YOUR PHYSICIAN'S OFFICE THE DAY PRIOR TO YOUR SURGERY TO OBTAIN YOUR ARRIVAL TIME.     The evening before surgery do not eat anything after 9 p.m. ( this includes hard candy, chewing gum and mints).  You may only have GATORADE, POWERADE AND WATER  from 9 p.m. until you leave your home.   DO NOT DRINK ANY LIQUIDS ON THE WAY TO THE HOSPITAL.      Why does your anesthesiologist allow you to drink Gatorade/Powerade before surgery?  Gatorade/Powerade helps to increase your comfort before surgery and to decrease your nausea after surgery.   The carbohydrates in Gatorade/Powerade help reduce your body's stress response to surgery.  If you are a diabetic-drink only water prior to surgery.    Outpatient Surgery- May allow 2 adults (18 and older)/ Support Persons (1 being the designated ) for all surgical/procedural patients. A breastfeeding mother will be allowed her infant and 2 adult Support Persons. No one under the age of 18 will be allowed in the building.      SPECIAL MEDICATION INSTRUCTIONS: TAKE medications checked off by the Anesthesiologist on your Medication List.    Surgery Patients:  If you take ASPIRIN - Your PHYSICIAN/SURGEON will need to inform you IF/OR when you need to stop taking aspirin prior to your surgery.     Starting the week prior to surgery, do not take any medications containing IBUPROFEN or NSAIDS (Advil, Aleve, BC, Goody's, Ketorolac, Meloxicam, Mobic, Motrin, Naproxen, Toradol, etc).  If you are not sure if you should take a medicine please call your surgeon's office.  You may take Tylenol.    Do Not Wear any make-up  (especially eye make-up) to surgery. Please remove any false eyelashes or eyelash extensions. If you arrive the day of surgery with makeup/eyelashes on you will be required to remove prior to surgery. (There is a risk of corneal abrasions if eye makeup/eyelash extensions are not removed)    Leave all valuables at home.   Do Not wear any jewelry or watches, including any metal in body piercings. Jewelry must be removed prior to coming to the hospital.  There is a possibility that rings that are unable to be removed may be cut off if they are on the surgical extremity.    Please remove all hair extensions, wigs, clips and any other metal accessories/ ornaments from your hair.  These items may pose a flammable/fire risk in Surgery and must be removed.    Do not shave your surgical area at least 5 days prior to your surgery. The surgical prep will be performed at the hospital according to Infection Control regulations.    Contact Lens must be removed before surgery. Either do not wear the contact lens or bring a case and solution for storage.  Please bring a container for eyeglasses or dentures as required.  Bring any paperwork your physician has provided, such as consent forms,  history and physicals, doctor's orders, etc.   Bring comfortable clothes that are loose fitting to wear upon discharge. Take into consideration the type of surgery being performed.  Maintain your diet as advised per your physician the day prior to surgery.    Adequate rest the night before surgery is advised.   Park in the Parking lot behind the hospital or in the [facility] across the street from the parking lot. Parking is complimentary.  If you will be discharged the same day as your procedure, please arrange for a responsible adult to drive you home or to accompany you if traveling by taxi.   YOU WILL NOT BE PERMITTED TO DRIVE OR TO LEAVE THE HOSPITAL ALONE AFTER SURGERY.   If you are being discharged the same day, it is  strongly recommended that you arrange for someone to remain with you for the first 24 hrs following your surgery.    The Surgeon will speak to your family/visitor after your surgery regarding the outcome of your surgery and post op care.  The Surgeon may speak to you after your surgery, but there is a possibility you may not remember the details.  Please check with your family members regarding the conversation with the Surgeon.    We strongly recommend whoever is bringing you home be present for discharge instructions.  This will ensure a thorough understanding for your post op home care.              Bathing Instructions with Hibiclens  Shower the evening before and morning of your procedure with Chlorhexidine (Hibiclens)    Do not use Chlorhexidine on your face or genitals. Do not get in your eyes.  Wash your face with water and your regular face wash/soap  Use your regular shampoo  Apply Chlorhexidine (Hibiclens) directly on your skin or on a wet washcloth and wash gently. When showering: Move away from the shower stream when applying Chlorhexidine (Hibiclens) to avoid rinsing off too soon.  Rinse thoroughly with warm water  Do not dilute Chlorhexidine (Hibiclens)   Dry off as usual, do not use any deodorant, powder, body lotions, perfume, after shave or cologne.

## 2024-03-01 NOTE — TELEPHONE ENCOUNTER
Spoke with Karen Robb NP-- patient cleared for surgery. Will fax clearance letter. MALLORY Mendieta-BC

## 2024-03-04 ENCOUNTER — HOSPITAL ENCOUNTER (OUTPATIENT)
Facility: OTHER | Age: 59
Discharge: HOME | End: 2024-03-04
Attending: OBSTETRICS & GYNECOLOGY | Admitting: OBSTETRICS & GYNECOLOGY
Payer: COMMERCIAL

## 2024-03-04 ENCOUNTER — ANESTHESIA (OUTPATIENT)
Dept: SURGERY | Facility: OTHER | Age: 59
End: 2024-03-04
Payer: COMMERCIAL

## 2024-03-04 DIAGNOSIS — Z98.890 STATUS POST ANTERIOR COLPORRHAPHY: ICD-10-CM

## 2024-03-04 DIAGNOSIS — N99.3 VAGINAL VAULT PROLAPSE, POSTHYSTERECTOMY: Primary | ICD-10-CM

## 2024-03-04 DIAGNOSIS — N81.6 RECTOCELE: ICD-10-CM

## 2024-03-04 PROCEDURE — 25000003 PHARM REV CODE 250: Performed by: OBSTETRICS & GYNECOLOGY

## 2024-03-04 PROCEDURE — 71000033 HC RECOVERY, INTIAL HOUR: Performed by: OBSTETRICS & GYNECOLOGY

## 2024-03-04 PROCEDURE — 57288 REPAIR BLADDER DEFECT: CPT | Mod: AS,51,, | Performed by: PHYSICIAN ASSISTANT

## 2024-03-04 PROCEDURE — D9220A PRA ANESTHESIA: Mod: ANES,,, | Performed by: ANESTHESIOLOGY

## 2024-03-04 PROCEDURE — 27201423 OPTIME MED/SURG SUP & DEVICES STERILE SUPPLY: Performed by: OBSTETRICS & GYNECOLOGY

## 2024-03-04 PROCEDURE — 63600175 PHARM REV CODE 636 W HCPCS: Performed by: NURSE ANESTHETIST, CERTIFIED REGISTERED

## 2024-03-04 PROCEDURE — 36000706: Performed by: OBSTETRICS & GYNECOLOGY

## 2024-03-04 PROCEDURE — 71000016 HC POSTOP RECOV ADDL HR: Performed by: OBSTETRICS & GYNECOLOGY

## 2024-03-04 PROCEDURE — 37000008 HC ANESTHESIA 1ST 15 MINUTES: Performed by: OBSTETRICS & GYNECOLOGY

## 2024-03-04 PROCEDURE — 63600175 PHARM REV CODE 636 W HCPCS: Performed by: OBSTETRICS & GYNECOLOGY

## 2024-03-04 PROCEDURE — 63600175 PHARM REV CODE 636 W HCPCS: Performed by: ANESTHESIOLOGY

## 2024-03-04 PROCEDURE — D9220A PRA ANESTHESIA: Mod: CRNA,,, | Performed by: NURSE ANESTHETIST, CERTIFIED REGISTERED

## 2024-03-04 PROCEDURE — 25000003 PHARM REV CODE 250: Performed by: NURSE ANESTHETIST, CERTIFIED REGISTERED

## 2024-03-04 PROCEDURE — 36000707: Performed by: OBSTETRICS & GYNECOLOGY

## 2024-03-04 PROCEDURE — 57260 CMBN ANT PST COLPRHY: CPT | Mod: ,,, | Performed by: OBSTETRICS & GYNECOLOGY

## 2024-03-04 PROCEDURE — 71000015 HC POSTOP RECOV 1ST HR: Performed by: OBSTETRICS & GYNECOLOGY

## 2024-03-04 PROCEDURE — 57260 CMBN ANT PST COLPRHY: CPT | Mod: AS,,, | Performed by: PHYSICIAN ASSISTANT

## 2024-03-04 PROCEDURE — 37000009 HC ANESTHESIA EA ADD 15 MINS: Performed by: OBSTETRICS & GYNECOLOGY

## 2024-03-04 PROCEDURE — 57288 REPAIR BLADDER DEFECT: CPT | Mod: 51,,, | Performed by: OBSTETRICS & GYNECOLOGY

## 2024-03-04 PROCEDURE — C1771 REP DEV, URINARY, W/SLING: HCPCS | Performed by: OBSTETRICS & GYNECOLOGY

## 2024-03-04 PROCEDURE — 25000003 PHARM REV CODE 250: Performed by: ANESTHESIOLOGY

## 2024-03-04 DEVICE — TRANSVAGINAL MID-URETHRAL SLING
Type: IMPLANTABLE DEVICE | Site: VAGINA | Status: FUNCTIONAL
Brand: ADVANTAGE FIT™  SYSTEM

## 2024-03-04 RX ORDER — HEPARIN SODIUM 5000 [USP'U]/ML
5000 INJECTION, SOLUTION INTRAVENOUS; SUBCUTANEOUS
Status: DISCONTINUED | OUTPATIENT
Start: 2024-03-04 | End: 2024-03-04 | Stop reason: HOSPADM

## 2024-03-04 RX ORDER — SODIUM CHLORIDE 0.9 % (FLUSH) 0.9 %
3 SYRINGE (ML) INJECTION
Status: DISCONTINUED | OUTPATIENT
Start: 2024-03-04 | End: 2024-03-04 | Stop reason: HOSPADM

## 2024-03-04 RX ORDER — DEXTROMETHORPHAN HYDROBROMIDE, GUAIFENESIN 5; 100 MG/5ML; MG/5ML
650 LIQUID ORAL EVERY 6 HOURS
Qty: 60 TABLET | Refills: 2 | Status: SHIPPED | OUTPATIENT
Start: 2024-03-04

## 2024-03-04 RX ORDER — FENTANYL CITRATE 50 UG/ML
INJECTION, SOLUTION INTRAMUSCULAR; INTRAVENOUS
Status: DISCONTINUED | OUTPATIENT
Start: 2024-03-04 | End: 2024-03-04

## 2024-03-04 RX ORDER — PHENYLEPHRINE HCL IN 0.9% NACL 1 MG/10 ML
SYRINGE (ML) INTRAVENOUS
Status: DISCONTINUED | OUTPATIENT
Start: 2024-03-04 | End: 2024-03-04

## 2024-03-04 RX ORDER — DOCUSATE SODIUM 100 MG/1
100 CAPSULE, LIQUID FILLED ORAL 2 TIMES DAILY
Qty: 60 CAPSULE | Refills: 2 | Status: SHIPPED | OUTPATIENT
Start: 2024-03-04

## 2024-03-04 RX ORDER — KETAMINE HCL IN 0.9 % NACL 50 MG/5 ML
SYRINGE (ML) INTRAVENOUS
Status: DISCONTINUED | OUTPATIENT
Start: 2024-03-04 | End: 2024-03-04

## 2024-03-04 RX ORDER — EPHEDRINE SULFATE 50 MG/ML
INJECTION, SOLUTION INTRAVENOUS
Status: DISCONTINUED | OUTPATIENT
Start: 2024-03-04 | End: 2024-03-04

## 2024-03-04 RX ORDER — IBUPROFEN 600 MG/1
600 TABLET ORAL EVERY 6 HOURS
Qty: 60 TABLET | Refills: 2 | Status: SHIPPED | OUTPATIENT
Start: 2024-03-04

## 2024-03-04 RX ORDER — OXYCODONE HYDROCHLORIDE 5 MG/1
5 TABLET ORAL
Status: DISCONTINUED | OUTPATIENT
Start: 2024-03-04 | End: 2024-03-04 | Stop reason: HOSPADM

## 2024-03-04 RX ORDER — ONDANSETRON HYDROCHLORIDE 2 MG/ML
INJECTION, SOLUTION INTRAVENOUS
Status: DISCONTINUED | OUTPATIENT
Start: 2024-03-04 | End: 2024-03-04

## 2024-03-04 RX ORDER — OXYCODONE HYDROCHLORIDE 5 MG/1
5 TABLET ORAL EVERY 4 HOURS PRN
Qty: 30 TABLET | Refills: 0 | Status: SHIPPED | OUTPATIENT
Start: 2024-03-04

## 2024-03-04 RX ORDER — HYDROMORPHONE HYDROCHLORIDE 2 MG/ML
0.4 INJECTION, SOLUTION INTRAMUSCULAR; INTRAVENOUS; SUBCUTANEOUS EVERY 5 MIN PRN
Status: DISCONTINUED | OUTPATIENT
Start: 2024-03-04 | End: 2024-03-04 | Stop reason: HOSPADM

## 2024-03-04 RX ORDER — ACETAMINOPHEN 500 MG
1000 TABLET ORAL
Status: COMPLETED | OUTPATIENT
Start: 2024-03-04 | End: 2024-03-04

## 2024-03-04 RX ORDER — LIDOCAINE HYDROCHLORIDE 20 MG/ML
INJECTION INTRAVENOUS
Status: DISCONTINUED | OUTPATIENT
Start: 2024-03-04 | End: 2024-03-04

## 2024-03-04 RX ORDER — MUPIROCIN 20 MG/G
OINTMENT TOPICAL
Status: DISCONTINUED | OUTPATIENT
Start: 2024-03-04 | End: 2024-03-04 | Stop reason: HOSPADM

## 2024-03-04 RX ORDER — FAMOTIDINE 20 MG/1
20 TABLET, FILM COATED ORAL
Status: COMPLETED | OUTPATIENT
Start: 2024-03-04 | End: 2024-03-04

## 2024-03-04 RX ORDER — DEXAMETHASONE SODIUM PHOSPHATE 4 MG/ML
INJECTION, SOLUTION INTRA-ARTICULAR; INTRALESIONAL; INTRAMUSCULAR; INTRAVENOUS; SOFT TISSUE
Status: DISCONTINUED | OUTPATIENT
Start: 2024-03-04 | End: 2024-03-04

## 2024-03-04 RX ORDER — LIDOCAINE HYDROCHLORIDE AND EPINEPHRINE 10; 10 MG/ML; UG/ML
INJECTION, SOLUTION INFILTRATION; PERINEURAL
Status: DISCONTINUED | OUTPATIENT
Start: 2024-03-04 | End: 2024-03-04 | Stop reason: HOSPADM

## 2024-03-04 RX ORDER — GABAPENTIN 300 MG/1
300 CAPSULE ORAL
Status: DISCONTINUED | OUTPATIENT
Start: 2024-03-04 | End: 2024-03-04 | Stop reason: HOSPADM

## 2024-03-04 RX ORDER — PROCHLORPERAZINE EDISYLATE 5 MG/ML
5 INJECTION INTRAMUSCULAR; INTRAVENOUS EVERY 30 MIN PRN
Status: DISCONTINUED | OUTPATIENT
Start: 2024-03-04 | End: 2024-03-04 | Stop reason: HOSPADM

## 2024-03-04 RX ORDER — ROCURONIUM BROMIDE 10 MG/ML
INJECTION, SOLUTION INTRAVENOUS
Status: DISCONTINUED | OUTPATIENT
Start: 2024-03-04 | End: 2024-03-04

## 2024-03-04 RX ORDER — PROPOFOL 10 MG/ML
VIAL (ML) INTRAVENOUS
Status: DISCONTINUED | OUTPATIENT
Start: 2024-03-04 | End: 2024-03-04

## 2024-03-04 RX ORDER — SODIUM CHLORIDE, SODIUM LACTATE, POTASSIUM CHLORIDE, CALCIUM CHLORIDE 600; 310; 30; 20 MG/100ML; MG/100ML; MG/100ML; MG/100ML
INJECTION, SOLUTION INTRAVENOUS CONTINUOUS
Status: DISCONTINUED | OUTPATIENT
Start: 2024-03-04 | End: 2024-03-04 | Stop reason: HOSPADM

## 2024-03-04 RX ORDER — MIDAZOLAM HYDROCHLORIDE 1 MG/ML
INJECTION INTRAMUSCULAR; INTRAVENOUS
Status: DISCONTINUED | OUTPATIENT
Start: 2024-03-04 | End: 2024-03-04

## 2024-03-04 RX ORDER — KETOROLAC TROMETHAMINE 30 MG/ML
INJECTION, SOLUTION INTRAMUSCULAR; INTRAVENOUS
Status: DISCONTINUED | OUTPATIENT
Start: 2024-03-04 | End: 2024-03-04

## 2024-03-04 RX ORDER — MEPERIDINE HYDROCHLORIDE 25 MG/ML
12.5 INJECTION INTRAMUSCULAR; INTRAVENOUS; SUBCUTANEOUS ONCE AS NEEDED
Status: DISCONTINUED | OUTPATIENT
Start: 2024-03-04 | End: 2024-03-04 | Stop reason: HOSPADM

## 2024-03-04 RX ADMIN — LIDOCAINE HYDROCHLORIDE 100 MG: 20 INJECTION, SOLUTION INTRAVENOUS at 12:03

## 2024-03-04 RX ADMIN — SODIUM CHLORIDE, SODIUM LACTATE, POTASSIUM CHLORIDE, AND CALCIUM CHLORIDE: 600; 310; 30; 20 INJECTION, SOLUTION INTRAVENOUS at 12:03

## 2024-03-04 RX ADMIN — FENTANYL CITRATE 50 MCG: 0.05 INJECTION, SOLUTION INTRAMUSCULAR; INTRAVENOUS at 01:03

## 2024-03-04 RX ADMIN — FENTANYL CITRATE 50 MCG: 0.05 INJECTION, SOLUTION INTRAMUSCULAR; INTRAVENOUS at 02:03

## 2024-03-04 RX ADMIN — PROPOFOL 180 MG: 10 INJECTION, EMULSION INTRAVENOUS at 12:03

## 2024-03-04 RX ADMIN — EPHEDRINE SULFATE 5 MG: 50 INJECTION INTRAVENOUS at 01:03

## 2024-03-04 RX ADMIN — FENTANYL CITRATE 100 MCG: 0.05 INJECTION, SOLUTION INTRAMUSCULAR; INTRAVENOUS at 12:03

## 2024-03-04 RX ADMIN — CEFAZOLIN 2 G: 2 INJECTION, POWDER, FOR SOLUTION INTRAMUSCULAR; INTRAVENOUS at 12:03

## 2024-03-04 RX ADMIN — SUGAMMADEX 200 MG: 100 INJECTION, SOLUTION INTRAVENOUS at 02:03

## 2024-03-04 RX ADMIN — ACETAMINOPHEN 1000 MG: 500 TABLET ORAL at 09:03

## 2024-03-04 RX ADMIN — EPHEDRINE SULFATE 10 MG: 50 INJECTION INTRAVENOUS at 01:03

## 2024-03-04 RX ADMIN — Medication 20 MG: at 12:03

## 2024-03-04 RX ADMIN — KETOROLAC TROMETHAMINE 30 MG: 30 INJECTION, SOLUTION INTRAMUSCULAR; INTRAVENOUS at 02:03

## 2024-03-04 RX ADMIN — HEPARIN SODIUM 5000 UNITS: 5000 INJECTION INTRAVENOUS; SUBCUTANEOUS at 10:03

## 2024-03-04 RX ADMIN — FAMOTIDINE 20 MG: 20 TABLET ORAL at 09:03

## 2024-03-04 RX ADMIN — MIDAZOLAM HYDROCHLORIDE 2 MG: 1 INJECTION, SOLUTION INTRAMUSCULAR; INTRAVENOUS at 12:03

## 2024-03-04 RX ADMIN — PROPOFOL 50 MG: 10 INJECTION, EMULSION INTRAVENOUS at 02:03

## 2024-03-04 RX ADMIN — SODIUM CHLORIDE, SODIUM LACTATE, POTASSIUM CHLORIDE, AND CALCIUM CHLORIDE: 600; 310; 30; 20 INJECTION, SOLUTION INTRAVENOUS at 10:03

## 2024-03-04 RX ADMIN — ROCURONIUM BROMIDE 10 MG: 10 SOLUTION INTRAVENOUS at 01:03

## 2024-03-04 RX ADMIN — MUPIROCIN: 20 OINTMENT TOPICAL at 09:03

## 2024-03-04 RX ADMIN — ONDANSETRON HYDROCHLORIDE 4 MG: 2 INJECTION INTRAMUSCULAR; INTRAVENOUS at 02:03

## 2024-03-04 RX ADMIN — OXYCODONE 5 MG: 5 TABLET ORAL at 03:03

## 2024-03-04 RX ADMIN — ROCURONIUM BROMIDE 40 MG: 10 SOLUTION INTRAVENOUS at 12:03

## 2024-03-04 RX ADMIN — Medication 100 MCG: at 12:03

## 2024-03-04 RX ADMIN — DEXAMETHASONE SODIUM PHOSPHATE 4 MG: 4 INJECTION, SOLUTION INTRAMUSCULAR; INTRAVENOUS at 12:03

## 2024-03-04 RX ADMIN — GABAPENTIN 300 MG: 300 CAPSULE ORAL at 09:03

## 2024-03-04 NOTE — DISCHARGE INSTRUCTIONS
Sling Instructions     PAIN CONTROL/MEDICATIONS:     * Take prescribed medications as directed . It is important for your healing process and return to normal activity that you take these pain medicatons to be comfortable.     * DO NOT DRIVE or perform other activities that require you to be alert while taking narcotics, as these medications can make you very drowsy.     * You may restart ALL of your normal medications taken prior to surgery when you get home, unless instructed otherwise on discharge from the hospital.                       Please contact your primary care physician (PCP) for any questions regarding these medications.     ACTIVITY:      * Following surgery, you may be a little sore in the abdomen or pelvis-return to activity slowly and in moderation. Especially during the first week after your surgery,                    allow friends/family to take care of performing major house hold tasks (cooking & cleaning). This will help you get better faster.      * You may resume driving when you are no longer taking narcotic medications and when you are able to push the brakes on your car without feeling significant                    abdominal discomfort     *NOTHING in the vagina for 6 weeks (creams, tampons, intercourse, etc)     *DO NOT lift anything more than 10 lbs for 6 weeks ( ex. Gallon of milk)     * After 6 weeks, when lifting anything more than 10 lbs, use proper lifting technique to reduce strain:      1. Keep items close to your body during lifting.      2. Keep feet shoulder width apart, bend your knees, keep your back straight, and lift with your legs.      3. If you feel like you are straining, ask someone to help you.         DIET/BOWEL FUNCTION     * Upon returning to home, you may eat or drink anything you like.     * In order to avoid straining with bowel movements which may affect your  comfort and healing process; take the prescribed stool softener as directed.                  Also, stay well hydrated and increase dietary fiber intake as needed.     * If you do not have a bowel movement by 4 days after your surgery, take milk of  magnesia as directed up to 4 times in a row. If you still have no bowel movement  after                 this, call your MD for further instructions     URINATION:     * Following surgery, you may notice that your voiding is slightly different ( voiding  frequently, slower stream, feeling of post void urgency, and even occasional urinary leaking.)                  this is normal and will improve with time as the nerves and tissues heal .     *If you are unable to void normally prior to discharge, you'll be sent home with either an indwelling urinary catheter ( MADERA) or taught intermittent self catheterization. Someone                will teach you about either of these before you leave the hospital if needed.      1. If sent home with indwelling ([place]) , you will need to return to clinic within 1 week for removal of device and a voiding trial. Please call [phone] as soon as you                              get home to schedule this appointment.      2. If you're sent home with instructions to perform intermittent self-catheterization, you will be instructed to keep a record of this process. After 3 days of recording                               information, please call your surgeon's office ([phone]) during office hours with these results, and you will be instructed about what to do next.       POSTOPERATIVE FOLLOWUP     *Unless instructed otherwise, when you get home from the hospital, please call your surgeon's office at [phone] to schedule a postoperative check.     * Additionally, if you are sent home requiring catheterization, don't forget to schedule follow up appointments as noted above.       PLEASE WATCH FOR THE FOLLOWING SYMPTOMS POST OP      *FEVER >101 f    *PAINFUL URINATION    *INCREASED VAGINAL BLEEDING    *PERSISTENT  NAUSEA/VOMITING    *WORSENING PAIN               If you experience any of these, please contact your surgeon     * Weekdays 8am-5pm [phone]     * After 5 pm or weekends: Gynecology on call Physician [phone]   If you have difficulty contacting an on call physician , ask to connected to Labor and Delivery and ask for the resident on call and let them know the situation and they will contact              your MD

## 2024-03-04 NOTE — OR NURSING
Notified resident that patient passed her voiding trial, and urinated 300 ml after 300 ml sterile water was instilled into bladder. States it is OK to discharge patient.

## 2024-03-04 NOTE — ANESTHESIA POSTPROCEDURE EVALUATION
Anesthesia Post Evaluation    Patient: Charla Sharif    Procedure(s) Performed: Procedure(s) (LRB):  COLPORRHAPHY, COMBINED ANTEROPOSTERIOR (N/A)  SLING, MIDURETHRAL (N/A)  CYSTOSCOPY (N/A)    Final Anesthesia Type: general      Patient location during evaluation: PACU  Patient participation: Yes- Able to Participate  Level of consciousness: awake and alert  Post-procedure vital signs: reviewed and stable  Pain management: adequate  Airway patency: patent  DUSTY mitigation strategies: Extubation while patient is awake  PONV status at discharge: No PONV  Anesthetic complications: no      Cardiovascular status: hemodynamically stable  Respiratory status: unassisted  Hydration status: euvolemic  Follow-up not needed.              Vitals Value Taken Time   /59 03/04/24 1545   Temp 36.9 °C (98.4 °F) 03/04/24 1545   Pulse 86 03/04/24 1545   Resp 16 03/04/24 1545   SpO2 96 % 03/04/24 1545         Event Time   Out of Recovery 15:38:00         Pain/Fede Score: Pain Rating Prior to Med Admin: 4 (3/4/2024  3:09 PM)  Fede Score: 10 (3/4/2024  3:45 PM)

## 2024-03-04 NOTE — DISCHARGE SUMMARY
[facility]  Brief Op Note/Discharge Note  Short Stay    Admit Date: 3/4/2024    Discharge Date: 03/05/2024    Attending Physician: No att. 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       Discharged Condition: good    Hospital Course:   Patient was admitted for outpatient procedure as above, and tolerated the procedure well with no complications. Please see operative report for further details. Following the procedure, the patient was awakened from anesthesia and transferred to the recovery area in stable condition. She was discharged to home once ambulating, voiding, tolerating PO intake, and pain was well-controlled. Patient was given routine post-op instructions and prescriptions for pain medication to take as needed. Patient instructed to follow up with Dr. Carrol Barton as scheduled.    Final Diagnoses: Same as principal problem.    Disposition: Home or Self Care    Follow up/Patient Instructions:    Medications:  Reconciled Home Medications:      Medication List        START taking these medications      acetaminophen 650 MG Tbsr  Commonly known  as: TYLENOL  Take 1 tablet (650 mg total) by mouth every 6 (six) hours. Take every 6 hours alternating with Ibuprofen     docusate sodium 100 MG capsule  Commonly known as: COLACE  Take 1 capsule (100 mg total) by mouth 2 (two) times daily.     ibuprofen 600 MG tablet  Commonly known as: ADVIL,MOTRIN  Take 1 tablet (600 mg total) by mouth every 6 (six) hours. Take every 6 hours alternating with Tylenol     oxyCODONE 5 MG immediate release tablet  Commonly known as: ROXICODONE  Take 1 tablet (5 mg total) by mouth every 4 (four) hours as needed for Pain.            CONTINUE taking these medications      amLODIPine 5 MG tablet  Commonly known as: NORVASC  Take 5 mg by mouth once daily.     ARIPiprazole 2 MG Tab  Commonly known as: ABILIFY  Take 2 mg by mouth once daily.     atomoxetine 80 MG capsule  Commonly known as: STRATTERA  Take 80 mg by mouth once daily.     bisacodyL 5 mg EC tablet  Commonly known as: DULCOLAX  Take 5 mg by mouth daily as needed.     busPIRone 7.5 MG tablet  Commonly known as: BUSPAR  Take 7.5 mg by mouth 3 (three) times daily.     clonazePAM 0.5 MG tablet  Commonly known as: KlonoPIN  Take 0.5 mg by mouth 2 (two) times daily as needed for Anxiety.     enalapril 20 MG tablet  Commonly known as: VASOTEC  Take 1 tablet by mouth 2 (two) times daily.     EScitalopram oxalate 20 MG tablet  Commonly known as: LEXAPRO  Take 20 mg by mouth once daily.     estradioL 0.01 % (0.1 mg/gram) vaginal cream  Commonly known as: ESTRACE  0.5 grams with applicator or dime-sized amount with finger in vagina nightly x 2 weeks, then twice a week thereafter     gabapentin 300 MG capsule  Commonly known as: NEURONTIN  TAKE 1 CAPSULE BY MOUTH 3 TIMES DAILY     lubiprostone 24 MCG Cap  Commonly known as: AMITIZA  Take by mouth 2 (two) times daily with meals.     tiZANidine 4 MG tablet  Commonly known as: ZANAFLEX  Take 4 mg by mouth every 8 (eight) hours as needed.     traZODone 50 MG tablet  Commonly known as:  DESYREL  Take 50 mg by mouth nightly as needed for Insomnia.            Discharge Procedure Orders   Diet Adult Regular     Lifting restrictions   Order Comments: Nothing heavier than 10 lbs for 6 weeks     No driving until:   Order Comments: Until not taking narcotics     Pelvic Rest   Order Comments: Nothing in the vagina for 6 weeks     Notify your health care provider if you experience any of the following:  temperature >100.4     Notify your health care provider if you experience any of the following:  persistent nausea and vomiting or diarrhea     Notify your health care provider if you experience any of the following:  severe uncontrolled pain     Notify your health care provider if you experience any of the following:  severe persistent headache     Notify your health care provider if you experience any of the following:   Order Comments: Heavy vaginal bleeding saturating one pad/hour for two consecutive hours. Please see UroGyn Discharge Instructions from clinic.     Activity as tolerated   Order Comments: Please see UroGyn Clinic discharge instructions      Follow-up Information       Carrol Barton MD Follow up in 6 week(s).    Specialties: UroGynecology , Gynecology  Why: Post operative appt  Contact information:  [facility] [address]  [facility] [address] [phone]                               Katherine Boecking MD  Ob/Gyn PGY-4

## 2024-03-04 NOTE — TRANSFER OF CARE
"  Anesthesia Transfer of Care Note    Patient: Charla Sharif    Procedure(s) Performed: Procedure(s) (LRB):  COLPORRHAPHY, COMBINED ANTEROPOSTERIOR (N/A)  SLING, MIDURETHRAL (N/A)  CYSTOSCOPY (N/A)    Patient location: PACU    Anesthesia Type: general    Transport from OR: Transported from OR on 6-10 L/min O2 by face mask with adequate spontaneous ventilation    Post pain: adequate analgesia    Post assessment: no apparent anesthetic complications and tolerated procedure well    Post vital signs: stable    Level of consciousness: awake, alert and oriented    Nausea/Vomiting: no nausea/vomiting    Complications: none    Transfer of care protocol was followed      Last vitals: Visit Vitals  BP (!) 110/58 (BP Location: Right arm, Patient Position: Lying)   Pulse 104   Temp 36.3 °C (97.3 °F) (Temporal)   Resp 18   Ht 5' 2" (1.575 m)   Wt 65.3 kg (144 lb)   SpO2 95%   Breastfeeding No   BMI 26.34 kg/m²     "

## 2024-03-04 NOTE — OPERATIVE NOTE ADDENDUM
Certification of Assistant at Surgery       Surgery Date: 3/4/2024     Participating Surgeons:  Surgeon(s) and Role:     * Carrol Barton MD - Primary    Procedures:  Procedure(s) (LRB):  XI ROBOTIC SACROCOLPOPEXY, ABDOMINAL (N/A)  COLPORRHAPHY, COMBINED ANTEROPOSTERIOR (N/A)  SLING, MIDURETHRAL (N/A)  CYSTOSCOPY (N/A)    Assistant Surgeon's Certification of Necessity:  I understand that section 1842 (b) (6) (d) of the Social Security Act generally prohibits Medicare Part B reasonable charge payment for the services of assistants at surgery in teaching hospitals when qualified residents are available to furnish such services. I certify that the services for which payment is claimed were medically necessary, and that no qualified resident was available to perform the services. I further understand that these services are subject to post-payment review by the Medicare carrier.      Santy Butterfield PA-C    03/04/2024  11:44 AM

## 2024-03-04 NOTE — OP NOTE
Date of Operation: 03/04/2024    Title of Operation:  1)  Anterior repair  2)  Placement of retropubic tension-free midurethral sling, Advantage Fit ([employer])  3)  Posterior repair with perineorrhaphy  4)  Cystourethroscopy    Indications for Surgery:  Last HPI from 09/16/2022  1)  UI:  (+) BECKY > (+) UUI  X 1 year.  (+) pads (for fecal issues, no urine on pads).  Daytime frequency: Q 1 hours.  Nocturia: Yes: 2/night.   (+) dysuria,  (--) hematuria,  (--) frequent UTIs.  (--) complete bladder emptying. +PV dribbling. DV with variable 2nd amount.      2)  POP:  Present x 1-2 months. To introitus.  Symptoms:(+)  pressure.  (--) vaginal bleeding. (--) vaginal discharge. (+) sexually active.  (--) dyspareunia.  (+)  Vaginal dryness. (+) vaginal estrogen use--uses 0.5 3x/week--not consistent.    POP-Q:  Aa 0; Ba 0; C -8; Ap 0; Bp 0.  Genital hiatus 4, perineal body 2, total vaginal length 10.   Pvr 40 mL     3)  BM:  (+) constipation/straining x several months since rectal prolapse. Was not constipated before that. Taking linzess-not sure is helping. Has BM Q4 days.   (--) chronic diarrhea. (--) hematochezia.  + fecal incontinence (2x/week).  (--) fecal smearing/urgency.  (--) complete evacuation.  NO splinting.   --2 PPD for fecal smearing   --mucus discharge+  --rectal prolapse x 2 months  --8/2022 dynamic MRI:  Impression:  1.  Anatomic findings: Postop change of hysterectomy.  2.  Functional evaluation: Normal anorectal junction location and widened levator hiatus width at rest with mild/Grade 1 (2-4 cm) descent and mild/Grade 1 (6-8 cm) widening during defecation.  3.  Anterior compartment findings: Grade 1 cystocele.  4.  Middle compartment findings: Normal.  5.  Posterior compartment findings: Grade 1 peritoneocele and moderate rectocele.     12/23/2022 (virtual)  1) Rectal prolapse:  --still feeling some tissue bulging at anal area, marlyn with more activities and straining with BMs  --now having some FI  and mucus discharge     2)  Vaginal atrophy (dryness):  using vaginal estrogen 2x/week     3)  Mixed urinary incontinence, urge < stress:    --still having AMOS, may be worse     4)  constipation/straining with stools:  --taking trulance daily + miralax PRN  --certain foods trigger  --has not started daily fiber  --did not start PT     5)  Stage 2 cystocele/rectocele, stage 1 vaginal vault prolapse:  --still bothersome     08/14/2023  1) Rectal prolapse:  --still feeling some tissue bulging at anal area, marlyn with more activities and straining with BMs              --still feels something coming out--worries about rectal prolapse  --now having some FI and mucus discharge  --saw Dr. Zamora ([facility]) 1/2023:  - No evidence of full thickness rectal prolapse  - We discussed risks, benefits and alternatives of excisional hemorrhoidectomy.  We discussed postoperative pain expectations, risk of bleeding, and infection.  Consent obtained     8/2022 MR perez:  Impression:  1.  Anatomic findings: Postop change of hysterectomy.  2.  Functional evaluation: Normal anorectal junction location and widened levator hiatus width at rest with mild/Grade 1 (2-4 cm) descent and mild/Grade 1 (6-8 cm) widening during defecation.  3.  Anterior compartment findings: Grade 1 cystocele.  4.  Middle compartment findings: Normal.  5.  Posterior compartment findings: Grade 1 peritoneocele and moderate rectocele.     2)  Vaginal atrophy (dryness):  using vaginal estrogen 2x/week     3)  Mixed urinary incontinence, urge < stress:    --still having AMOS, may be worse; 1 PPD, min wetness  --never did UDS     4)  constipation/straining with stools:  --taking amitiza 24 mcg daily +high fiber diet + 1 fiber pill/day + miralax PRN (not weekly)              --controlled; denies significant straining  --not taking narcotics  --takes xanaflex for back pain; has some residual numbness--takes gabapentin  --certain foods trigger  --did not start PT--had back surgery +  father      5)  Stage 2 cystocele/rectocele, stage 1 vaginal vault prolapse:  --feels worse than last visit     6)  Fecal incontinence:  --new since last visit with CRS  --says goes through a few diapers a day     2024  1) Rectal prolapse:  --no s/sx      2)  Vaginal atrophy (dryness):  using vaginal estrogen 2x/week--sometimes forgets     3)  Mixed urinary incontinence, urge < stress:    --last visit: still having AMOS, may be worse; 1 PPD, min wetness  --today: BECKY>UUI; 3-4 PPD, min wetness unless accident; nocturia 4-5x/PM.   --never did UDS  --did 2 PFPT sessions; embarrassed; does practice on own     4)  constipation/straining with stools:  --no major straining s/p SNS + daily fiber pills  --was taking amitiza 24 mcg daily +high fiber diet + 1 fiber pill/day + miralax PRN (not weekly)  --not taking narcotics  --takes xanaflex for back pain; has some residual numbness--takes gabapentin  --certain foods trigger  --did not start PT--had back surgery + father      5)  Stage 2 cystocele/rectocele, stage 1 vaginal vault prolapse:  --feels worse than last visit  --still bothersome     6)  Fecal incontinence:  --s/p SNS 10/2023 Sera  --says goes through a few diapers a day     7)  Smoking:  --stopped years ago  --stopped vaping 3-4 months ago; vapes once/month if stressed     Changes from last visit:  +BECKY > + uui--changing underwear 3-4 times daily.  Voiding every 1 hour during the day and 3-4/ night-- does not limit fluids 2 hours prior  Bowels are improved with interstim     2024  Suds  3.  URETHRAL FUNCTION/STORAGE PHASE:     a.  WITH prolapse reduction:  CLPP (150 mL): Negative  at  170 cm H20  VLPP (150 mL): Negative  at  109 cm H20   CLPP (300 mL): Negative  at  179 cm H20  VLPP (300 mL): Negative  at  128 cm H20   After pves catheter was removed, POS VLPP but NEG CLPP.      These findings are consistent with Positive urodynamic stress incontinence only with valsalva after removal of pves  catheter.     Assessment:  UF with insufficient volume for interpretation.  PF prolonged.  Compliance normal.  Max capacity 441 mL.  DO (--).  BECKY (+).    Cysto  normal    Preoperative Diagnosis:  1. Urinary incontinence, mixed    2. Vaginal vault prolapse, posthysterectomy    3. Cystocele, midline    4. Rectocele, female    5. Internal prolapsed hemorrhoids    6. Myalgia of pelvic floor    7. Straining with stools    8. Vaginal atrophy    9. Incontinence of feces, unspecified fecal incontinence type    10. Slow stream  11.  Urodynamic stress incontinence    Postoperative Diagnosis:  1. Urinary incontinence, mixed    2. Vaginal vault prolapse, posthysterectomy    3. Cystocele, midline    4. Rectocele, female    5. Internal prolapsed hemorrhoids    6. Myalgia of pelvic floor    7. Straining with stools    8. Vaginal atrophy    9. Incontinence of feces, unspecified fecal incontinence type    10. Slow stream  11.  Urodynamic stress incontinence    Anesthesia:  General endotracheal anesthesia.  Additionally, a dilute solution of 1% lidocaine with epinephrine was injected vaginally for local anesthesia.    Specimen (Bacteriological, Pathological or other):  none    Vaginal Packing (type and #):   Yes - kerlix, #1     Prosthetic Device/Implant:  1)  Advantage Fit sling.  LOT# [id number].      Surgeons Narrative:    Surgeon: Carrol Barton MD    Assistants:  Katherine Boecking, MD (PGY4).  GENARO Dumont (insufficient resident assistance).     Intravenous Fluids:  1500 mL     Estimated Blood Loss:  100 mL     Urine Output:  350 mL     Counts:  Sponge, lap, needle counts correct x 2.     Drains: Samuels catheter.     Disposition:  The patient was sent to the PACU in stable condition.     Findings:     1.  On exam under anesthesia,  normal external female genitalia. There was prolapse as previously noted in clinic.  The apex was well-supported, and there was a small cystocele/rectocele.  Uterus and cervix: Absent  Adnexa: non  palpable     2. On cystoscopy, the bladder mucosa was Normal.  After A&P repair and sling, there was no suture or mesh within the bladder mucosa.  The ureteral orifices were visualized bilaterally with (+) noted good efflux x 2. On a systematic survey of the bladder dome to the base of the urethrovesical junction, there were not other abnormalities noted. The urethra was normal on retraction of the scope.     3.  Rectal exam:  At the close of the case, rectal exam was performed.  There was no suture, mesh, or other injury noted on exam.  No obvious masses were palpated.     Description of procedure:    The patient was identified in the preoperative area where informed consent was confirmed, and she was taken to the operating room where an adequate level of general anesthesia was obtained.  The patient was positioned in lithotomy position with legs in Cisco stirrups. Care was taken to avoid joint hyperflexion or hyperextension, and all extremity surfaces were carefully padded so as to minimize risk of neurologic injury. Intravenous antibiotics were administered preoperatively. Sequential compression devices were applied to the patient's lower extremities preoperatively and heparin was administered subcutaneously for VTE prophylaxis.  Surgical time-out was performed, where the patient was identified and procedures confirmed.  An examination under anesthesia was performed with findings described as above.  The patient's abdomen, perineum, and vagina were sterilely prepped and draped. A jackson catheter was placed in the bladder for drainage.      Since the apex was well-supported, the procedure commenced with performance of the anterior repair.  Allis clamps were used to delineate the proximal-most  (approximately 1 cm distal to the cuff) and distal-most (1 cm cephalad to the urethrovesical junction) aspects of the defect, approximately 1 cm distal to the cuff.  This was then injected with 1% lidocaine with epinephrine,  allowing for hydrodistention of the tissue.  A vertical incision was made between the 2 Allis clamps.  Allis clamps were then placed along the margins of the epithelial incision and the Metzenbaum scissors were used to separate the underlying fibromuscular tissue from the epithelium bilaterally to the lateral-most aspects of the defect.  Several stitches of 2-0 PDS suture were then used in   mattress fashion to reapproximate the cystocele defect.  This resulted in excellent reduction of the defect.  Excellent hemostasis was noted at the end of this part of the procedure.  The bed of the repair was irrigated with sterile water.  The epithelial defect was then trimmed and closed with a running-locking stitch of 2-0 Vicryl.         We then proceeded with placement of the Advantage Fit midurethral sling. The skin was marked just above the symphysis pubis, 2 cm laterally on either side of the midline indicating the exit sites for the trocars.  The Samuels catheter was palpated at the urethrovesical junction, and 2 Allis clamps were placed at the anterior vaginal wall along the midurethra. The Samuels catheter was removed from the patient's bladder. The vaginal epithelium between the two Allis clamps was injected with the 1% lidocaine with epinephrine.  Metzenbaum scissors were used to dissect the vaginal epithelium off the underlying pubocervical muscular tissue in the direction of the angle formed between the ischiopubic ramus and the pubic bone bilaterally. The rigid catheter guide was used to deviate the bladder neck and urethra to the patient's left while the right trocar was advanced to the dissected tract in the patient's right side.  Once the urogenital membrane was perforated, the trocar and handle were reoriented in the sagittal plane and the tip of the trocar was advanced through the retropubic space in intimate proximity to the pubic bone.  The trocar was then advanced through the skin approximately 2 cm to the  right of the midline just above the pubic symphysis. The passage of the Advantage Fit trocar was repeated on the patient's left hand side in a similar fashion, using the catheter guide to deviate the bladder neck to the right.  At this point, cystourethroscopy was performed. Cystoscopy was negative for injury after infusion of at least 300 mL in the bladder.  The ureteral orifices were noted to have good efflux bilaterally.  The bladder was then drained. With a #10 Hegar dilator placed between the sling mesh and the urethra, the arms of the sling were elevated above the pubic symphysis and the plastic sheaths were removed from the mesh arms.  Excess mesh was trimmed beneath the suprapubic skin.  The Hegar dilator ensured that the mesh sling was placed in a tension-free manner.  The vaginal mucosa overlying the sling mesh was closed with a several mattress stitches of 2-0 Vicryl with excellent hemostasis noted.  The suprapubic incisions were closed with dermabond.    Finally, we performed the posterior colporrhaphy and perineorrhaphy.  Allis clamps were placed on the left and right hymenal remnants and at the proximal limit of the rectovaginal septal defect along the vaginal mucosa. The perineal skin to be resected for the perineorrhaphy was grasped with several Allis clamps.  The perineum and vaginal mucosa were injected with 1% lidocaine with epinephrine, which was distributed beneath the perineal skin and vaginal mucosa both in the midline and in the direction of the fornices. The perineal skin within the outlined area was dissected off the underlying perineal body with Metzenbaum scissors.  The posterior vaginal mucosa was then incised in the midline with Metzenbaum scissors.  The left and right margins of the vaginal mucosa were grasped with Allis clamps and the vaginal mucosa was dissected sharply off of the underlying rectovaginal fibromuscular connective tissue.  Individual areas of bleeding were made  hemostatic with the electrocautery.  The rectovaginal fibromuscular tissue was exposed bilaterally to the margins of the levator ani muscles and plicated in the midline with a series of vertical mattress stitches of 0-vicryl.  As we approached the introitus, we brought together the perineal body in the midline between the hymenal remnants.  A series of vertical mattress stitches were used to plicate the perineal body beneath the perineal skin thereby plicating the superficial and deep transverse perineal muscles and bulbocavernosus muscles.  Care was taken to make sure that vaginal caliber/ introitus allowed 2 - 3 fingerbreadths to be placed without difficulty. There was no significant posterior vaginal wall contracture/ ridging at the completion of the plication. The vaginal mucosal margins of the vertical incision were trimmed with Metzenbaum scissors to remove less than 1/2 centimeter of reduntant mucosa on each side and reapproximated with a running, locking stitch of 2-0 Vicryl.  Excellent hemostasis was observed along the suture line. Vaginal exam confirmed good reinforcement of the rectocele site without ridging or significant contracture of the vault.  Rectovaginal examination was performed with findings as described above.    At the close of the case, all counts were correct x2.  The vagina was irrigated, and all irrigants were removed.  The vagina was packed with a role of Kerlix, coated with KY jelly for assurance of immediate postop hemostasis.  The Samuels was in place and draining well.  The patient was awakened from general endotracheal anesthesia and was taken to the Recovery Room in stable condition.  She tolerated the procedure well.    I was present and scrubbed for the entire procedure.

## 2024-03-04 NOTE — OR NURSING
VSS on RA. Pain is tolerable per pt.  PIV CDI. Meets PACU discharge criteria. Ready for transfer to phase II. Family notified.

## 2024-03-05 VITALS
BODY MASS INDEX: 26.5 KG/M2 | RESPIRATION RATE: 18 BRPM | HEART RATE: 80 BPM | SYSTOLIC BLOOD PRESSURE: 105 MMHG | TEMPERATURE: 98 F | OXYGEN SATURATION: 99 % | HEIGHT: 62 IN | WEIGHT: 144 LBS | DIASTOLIC BLOOD PRESSURE: 68 MMHG

## 2024-03-13 ENCOUNTER — TELEPHONE (OUTPATIENT)
Dept: UROGYNECOLOGY | Facility: CLINIC | Age: 59
End: 2024-03-13
Payer: COMMERCIAL

## 2024-03-13 NOTE — TELEPHONE ENCOUNTER
----- Message from Ashley Bowser LPN sent at 3/13/2024 12:50 PM CDT -----  Regarding: FW: Pt advice  Contact: [phone]    ----- Message -----  From: Hortencia Gamez  Sent: 3/13/2024  12:20 PM CDT  To: [facility] Staff  Subject: Pt advice                                        Pt is calling to speak with the provider nurse states she had sx on last week and having discharge. Please call would like to discuss

## 2024-03-13 NOTE — TELEPHONE ENCOUNTER
----- Message from Ashley Bowser LPN sent at 3/13/2024 12:50 PM CDT -----  Regarding: FW: Pt advice  Contact: [phone]    ----- Message -----  From: Hortencia Gamez  Sent: 3/13/2024  12:20 PM CDT  To: [facility] Staff  Subject: Pt advice                                        Pt is calling to speak with the provider nurse states she had sx on last week and having discharge. Please call would like to discuss

## 2024-03-13 NOTE — TELEPHONE ENCOUNTER
Patient complains of tan, dark brown discharge with slight odor.  Reports amount to be decreasing. Will continue to monitor. She will call if increases in amount or persists. MALLORY Mendieta-BC

## 2024-04-17 NOTE — PROGRESS NOTES
Urogyn follow up  04/23/2024    [facility] - UROGYNECOLOGY  [address] [id number]    Charla Sharif  [id number]  1965      Charla Sharif is a 59 y.o. here for postop check.     Date of Operation: 03/04/2024     Title of Operation:  1)  Anterior repair  2)  Placement of retropubic tension-free midurethral sling, [employer] Fit ([employer])  3)  Posterior repair with perineorrhaphy  4)  Cystourethroscopy     Indications for Surgery:  Last HPI from 09/16/2022  1)  UI:  (+) BECKY > (+) UUI  X 1 year.  (+) pads (for fecal issues, no urine on pads).  Daytime frequency: Q 1 hours.  Nocturia: Yes: 2/night.   (+) dysuria,  (--) hematuria,  (--) frequent UTIs.  (--) complete bladder emptying. +PV dribbling. DV with variable 2nd amount.      2)  POP:  Present x 1-2 months. To introitus.  Symptoms:(+)  pressure.  (--) vaginal bleeding. (--) vaginal discharge. (+) sexually active.  (--) dyspareunia.  (+)  Vaginal dryness. (+) vaginal estrogen use--uses 0.5 3x/week--not consistent.    POP-Q:  Aa 0; Ba 0; C -8; Ap 0; Bp 0.  Genital hiatus 4, perineal body 2, total vaginal length 10.   Pvr 40 mL     3)  BM:  (+) constipation/straining x several months since rectal prolapse. Was not constipated before that. Taking linzess-not sure is helping. Has BM Q4 days.   (--) chronic diarrhea. (--) hematochezia.  + fecal incontinence (2x/week).  (--) fecal smearing/urgency.  (--) complete evacuation.  NO splinting.   --2 PPD for fecal smearing   --mucus discharge+  --rectal prolapse x 2 months  --8/2022 dynamic MRI:  Impression:  1.  Anatomic findings: Postop change of hysterectomy.  2.  Functional evaluation: Normal anorectal junction location and widened levator hiatus width at rest with mild/Grade 1 (2-4 cm) descent and mild/Grade 1 (6-8 cm) widening during defecation.  3.  Anterior compartment findings: Grade 1 cystocele.  4.  Middle compartment findings: Normal.  5.  Posterior compartment  findings: Grade 1 peritoneocele and moderate rectocele.     12/23/2022 (virtual)  1) Rectal prolapse:  --still feeling some tissue bulging at anal area, marlyn with more activities and straining with BMs  --now having some FI and mucus discharge     2)  Vaginal atrophy (dryness):  using vaginal estrogen 2x/week     3)  Mixed urinary incontinence, urge < stress:    --still having AMOS, may be worse     4)  constipation/straining with stools:  --taking trulance daily + miralax PRN  --certain foods trigger  --has not started daily fiber  --did not start PT     5)  Stage 2 cystocele/rectocele, stage 1 vaginal vault prolapse:  --still bothersome     08/14/2023  1) Rectal prolapse:  --still feeling some tissue bulging at anal area, marlyn with more activities and straining with BMs              --still feels something coming out--worries about rectal prolapse  --now having some FI and mucus discharge  --saw Dr. Zamora ([facility]) 1/2023:  - No evidence of full thickness rectal prolapse  - We discussed risks, benefits and alternatives of excisional hemorrhoidectomy.  We discussed postoperative pain expectations, risk of bleeding, and infection.  Consent obtained     8/2022 MR perez:  Impression:  1.  Anatomic findings: Postop change of hysterectomy.  2.  Functional evaluation: Normal anorectal junction location and widened levator hiatus width at rest with mild/Grade 1 (2-4 cm) descent and mild/Grade 1 (6-8 cm) widening during defecation.  3.  Anterior compartment findings: Grade 1 cystocele.  4.  Middle compartment findings: Normal.  5.  Posterior compartment findings: Grade 1 peritoneocele and moderate rectocele.     2)  Vaginal atrophy (dryness):  using vaginal estrogen 2x/week     3)  Mixed urinary incontinence, urge < stress:    --still having AMOS, may be worse; 1 PPD, min wetness  --never did UDS     4)  constipation/straining with stools:  --taking amitiza 24 mcg daily +high fiber diet + 1 fiber pill/day + miralax PRN (not weekly)               --controlled; denies significant straining  --not taking narcotics  --takes xanaflex for back pain; has some residual numbness--takes gabapentin  --certain foods trigger  --did not start PT--had back surgery + father      5)  Stage 2 cystocele/rectocele, stage 1 vaginal vault prolapse:  --feels worse than last visit     6)  Fecal incontinence:  --new since last visit with CRS  --says goes through a few diapers a day     2024  1) Rectal prolapse:  --no s/sx      2)  Vaginal atrophy (dryness):  using vaginal estrogen 2x/week--sometimes forgets     3)  Mixed urinary incontinence, urge < stress:    --last visit: still having AMOS, may be worse; 1 PPD, min wetness  --today: BECKY>UUI; 3-4 PPD, min wetness unless accident; nocturia 4-5x/PM.   --never did UDS  --did 2 PFPT sessions; embarrassed; does practice on own     4)  constipation/straining with stools:  --no major straining s/p SNS + daily fiber pills  --was taking amitiza 24 mcg daily +high fiber diet + 1 fiber pill/day + miralax PRN (not weekly)  --not taking narcotics  --takes xanaflex for back pain; has some residual numbness--takes gabapentin  --certain foods trigger  --did not start PT--had back surgery + father      5)  Stage 2 cystocele/rectocele, stage 1 vaginal vault prolapse:  --feels worse than last visit  --still bothersome     6)  Fecal incontinence:  --s/p SNS 10/2023 [facility]  --says goes through a few diapers a day     7)  Smoking:  --stopped years ago  --stopped vaping 3-4 months ago; vapes once/month if stressed     Changes from last visit:  +BECKY > + uui--changing underwear 3-4 times daily.  Voiding every 1 hour during the day and 3-4/ night-- does not limit fluids 2 hours prior  Bowels are improved with interstim     2024  Suds  3.  URETHRAL FUNCTION/STORAGE PHASE:     a.  WITH prolapse reduction:  CLPP (150 mL): Negative  at  170 cm H20  VLPP (150 mL): Negative  at  109 cm H20   CLPP (300 mL): Negative  at  179 cm  H20  VLPP (300 mL): Negative  at  128 cm H20   After pves catheter was removed, POS VLPP but NEG CLPP.      These findings are consistent with Positive urodynamic stress incontinence only with valsalva after removal of pves catheter.     Assessment:  UF with insufficient volume for interpretation.  PF prolonged.  Compliance normal.  Max capacity 441 mL.  DO (--).  BECKY (+).    Cysto  Normal    Issues include:  Patient Active Problem List   Diagnosis    Urinary incontinence, mixed    Rectal prolapse    Vaginal vault prolapse, posthysterectomy    Myalgia of pelvic floor    Straining with stools    Constipation    Vaginal atrophy    Cystocele, midline    Rectocele, female    Internal prolapsed hemorrhoids    Full incontinence of feces    History of tobacco use    S/p A/P/MUS/Cysto       History since last visit:   GYN:  scant tan discharge--denies bleeding or pain  Bladder issues: Denies UI, urinary urgency, frequency, or leakage\  Bowel issues: Denies constipation or straining.  Taking stool softeners    Well woman:  Pap test: post IVA.  History of abnormal paps: No.  History of STIs:  No  Mammogram: Date of last: 5/2023 ([place]  Result: Normal per report.  Colonoscopy: Date of last: 2021 ([place]).  Result:  normal per report.  Repeat due:  per CRS/PCP.    DEXA:  none:    Medications:    Current Outpatient Medications:     acetaminophen (TYLENOL) 650 MG TbSR, Take 1 tablet (650 mg total) by mouth every 6 (six) hours. Take every 6 hours alternating with Ibuprofen, Disp: 60 tablet, Rfl: 2    amLODIPine (NORVASC) 5 MG tablet, Take 5 mg by mouth once daily., Disp: , Rfl:     ARIPiprazole (ABILIFY) 2 MG Tab, Take 2 mg by mouth once daily., Disp: , Rfl:     atomoxetine (STRATTERA) 80 MG capsule, Take 80 mg by mouth once daily., Disp: , Rfl:     bisacodyL (DULCOLAX) 5 mg EC tablet, Take 5 mg by mouth daily as needed., Disp: , Rfl:     busPIRone (BUSPAR) 7.5 MG tablet, Take 7.5 mg by mouth 3 (three) times  daily., Disp: , Rfl:     clonazePAM (KLONOPIN) 0.5 MG tablet, Take 0.5 mg by mouth 2 (two) times daily as needed for Anxiety., Disp: , Rfl:     docusate sodium (COLACE) 100 MG capsule, Take 1 capsule (100 mg total) by mouth 2 (two) times daily., Disp: 60 capsule, Rfl: 2    enalapril (VASOTEC) 20 MG tablet, Take 1 tablet by mouth 2 (two) times daily., Disp: , Rfl:     EScitalopram oxalate (LEXAPRO) 20 MG tablet, Take 20 mg by mouth once daily., Disp: , Rfl:     estradioL (ESTRACE) 0.01 % (0.1 mg/gram) vaginal cream, 0.5 grams with applicator or dime-sized amount with finger in vagina nightly x 2 weeks, then twice a week thereafter, Disp: 42.5 g, Rfl: 11    gabapentin (NEURONTIN) 300 MG capsule, TAKE 1 CAPSULE BY MOUTH 3 TIMES DAILY, Disp: , Rfl:     ibuprofen (ADVIL,MOTRIN) 600 MG tablet, Take 1 tablet (600 mg total) by mouth every 6 (six) hours. Take every 6 hours alternating with Tylenol, Disp: 60 tablet, Rfl: 2    lubiprostone (AMITIZA) 24 MCG Cap, Take by mouth 2 (two) times daily with meals., Disp: , Rfl:     oxyCODONE (ROXICODONE) 5 MG immediate release tablet, Take 1 tablet (5 mg total) by mouth every 4 (four) hours as needed for Pain., Disp: 30 tablet, Rfl: 0    tiZANidine (ZANAFLEX) 4 MG tablet, Take 4 mg by mouth every 8 (eight) hours as needed., Disp: , Rfl:     traZODone (DESYREL) 50 MG tablet, Take 50 mg by mouth nightly as needed for Insomnia., Disp: , Rfl:     estradioL (ESTRACE) 0.01 % (0.1 mg/gram) vaginal cream, Place 1 g vaginally twice a week., Disp: 42.5 g, Rfl: 3  No current facility-administered medications for this visit.    Facility-Administered Medications Ordered in Other Visits:     0.9%  NaCl infusion, , Intravenous, Continuous, Gracie Jacobs NP    lactated ringers infusion, , Intravenous, Continuous, Tigre Casanova MD, New Bag at 03/04/24 1255    LIDOcaine (PF) 10 mg/ml (1%) injection 10 mg, 1 mL, Intradermal, Once, Gracie Jacobs, NP    mupirocin 2 % ointment, , Nasal, On  "Call Procedure, Gracie Jacobs NP, Given at 10/04/23 1020    ROS:  As per HPI.      Exam  BP (!) 142/80 (BP Location: Right arm, Patient Position: Sitting, BP Method: Medium (Manual))   Ht 5' 2" (1.575 m)   Wt 64.6 kg (142 lb 6.7 oz)   BMI 26.05 kg/m²   General: alert and oriented, no acute distress  Respiratory: normal respiratory effort  Abd: soft, non-tender, non-distended    Pelvic  Ext. Genitalia: normal external genitalia. Normal bartholins and skenes glands  Vagina: + atrophy. Normal vaginal mucosa without lesions. Few sutures still present-- healing well. No discharge noted.  Sling path nontender. No mesh visible/ palpable. Non-tender bladder base without palpable mass.  Cervix: absent  Uterus:  absent   Urethra: no masses or tenderness  Urethral meatus: no lesions, caruncle or prolapse.    POP-Q: no pop with valsalva    Dr. Barton present during exam    Impression  1. Post-operative state        2. Vaginal atrophy  estradioL (ESTRACE) 0.01 % (0.1 mg/gram) vaginal cream        We reviewed the above issues and discussed options for short-term versus long-term management of their problems.   Plan:   Postop state: healing well. You can resume normal activity.  Always get help lifting 50 lbs or more.   Vaginal health:  Restart vaginal estrogen cream: 0.5 g in vagina before bed at least 2x/week.  Continue bowel regimen:  InterStim as per [facility] ([facility])  Schedule mammogram for 5/2024.   They will follow up with us in 4 months.  20 minutes were spent in face to face time with this patient  90 % of this time was spent in counseling and/or coordination of care     Sandi Pond NP  [facility]  Division of Female Pelvic Medicine and Reconstructive Surgery  Department of Obstetrics & Gynecology    "

## 2024-04-18 ENCOUNTER — OFFICE VISIT (OUTPATIENT)
Dept: UROGYNECOLOGY | Facility: CLINIC | Age: 59
End: 2024-04-18
Payer: COMMERCIAL

## 2024-04-18 VITALS
BODY MASS INDEX: 26.21 KG/M2 | SYSTOLIC BLOOD PRESSURE: 142 MMHG | WEIGHT: 142.44 LBS | DIASTOLIC BLOOD PRESSURE: 80 MMHG | HEIGHT: 62 IN

## 2024-04-18 DIAGNOSIS — N95.2 VAGINAL ATROPHY: ICD-10-CM

## 2024-04-18 DIAGNOSIS — Z98.890 POST-OPERATIVE STATE: Primary | ICD-10-CM

## 2024-04-18 PROCEDURE — 1160F RVW MEDS BY RX/DR IN RCRD: CPT | Mod: CPTII,S$GLB,, | Performed by: NURSE PRACTITIONER

## 2024-04-18 PROCEDURE — 99024 POSTOP FOLLOW-UP VISIT: CPT | Mod: S$GLB,,, | Performed by: NURSE PRACTITIONER

## 2024-04-18 PROCEDURE — 3079F DIAST BP 80-89 MM HG: CPT | Mod: CPTII,S$GLB,, | Performed by: NURSE PRACTITIONER

## 2024-04-18 PROCEDURE — 3077F SYST BP >= 140 MM HG: CPT | Mod: CPTII,S$GLB,, | Performed by: NURSE PRACTITIONER

## 2024-04-18 PROCEDURE — 1159F MED LIST DOCD IN RCRD: CPT | Mod: CPTII,S$GLB,, | Performed by: NURSE PRACTITIONER

## 2024-04-18 PROCEDURE — 99999 PR PBB SHADOW E&M-EST. PATIENT-LVL IV: CPT | Mod: PBBFAC,,, | Performed by: NURSE PRACTITIONER

## 2024-04-18 RX ORDER — ESTRADIOL 0.1 MG/G
1 CREAM VAGINAL
Qty: 42.5 G | Refills: 3 | Status: SHIPPED | OUTPATIENT
Start: 2024-04-18 | End: 2025-04-18

## 2024-04-18 NOTE — PATIENT INSTRUCTIONS
Postop state: healing well. You can resume normal activity.  Always get help lifting 50 lbs or more.   Vaginal health:  Restart vaginal estrogen cream: 0.5 g in vagina before bed at least 2x/week.  Continue bowel regimen:  InterStim as per CRS ([facility])  Schedule mammogram for 5/2024.   They will follow up with us in 4 months.

## 2024-08-26 ENCOUNTER — TELEPHONE (OUTPATIENT)
Dept: UROGYNECOLOGY | Facility: CLINIC | Age: 59
End: 2024-08-26
Payer: COMMERCIAL

## 2024-08-26 NOTE — TELEPHONE ENCOUNTER
Pt. Stated that she came today for her appointment with Dr. Barton on today when she arrived she realized that it was the wrong day. Pt. Stated that she will not come back on Wed. Pt. Stated that she'll call back to schedule after her vacation.

## 2024-08-26 NOTE — TELEPHONE ENCOUNTER
----- Message from Fransisco Dyer sent at 8/26/2024  3:15 PM CDT -----  Regarding: Advice  Contact: [phone]  Who call ?  Charla Sharif     What is the request Details : Pt calling to speak with someone in provider office regarding appt on 8/28. States she drove from [place] mistake her appt for today. Would like to know if she can be seen  if possible.       Can clinic  use patient portal  : No     What number to call back : [phone]

## (undated) DEVICE — SOL POVIDONE PREP IODINE 4 OZ

## (undated) DEVICE — BLADE SURG STAINLESS STEEL #11

## (undated) DEVICE — POSITIONER HEEL FOAM CONVOLTD

## (undated) DEVICE — SUT VICRYL CTD 2-0 GI 27 SH

## (undated) DEVICE — HANDSET INTERSTIM X COMM

## (undated) DEVICE — CATH FOL IC 3WAY 16F 5CCLF

## (undated) DEVICE — OBTURATOR BLADELESS 8MM XI CLR

## (undated) DEVICE — SOL IRRI STRL WATER 1000ML

## (undated) DEVICE — NDL HYPO STD REG BVL 18GX1.5IN

## (undated) DEVICE — Device

## (undated) DEVICE — SET CYSTO IRR DRP CHMBR 84IN

## (undated) DEVICE — SEAL UNIVERSAL 5MM-8MM XI

## (undated) DEVICE — SOL ELECTROLUBE ANTI-STIC

## (undated) DEVICE — SYR 10CC LUER LOCK

## (undated) DEVICE — PACK LAPSCP/PELVSCPY III TIBRN

## (undated) DEVICE — PORT ACCESS 8MM W/120MM LOW

## (undated) DEVICE — JELLY SURGILUBE 5GR

## (undated) DEVICE — GLOVE SENSICARE PI GRN 6.5

## (undated) DEVICE — GLOVE SENSICARE PI SURG 7

## (undated) DEVICE — APPLICATOR CHLORAPREP ORN 26ML

## (undated) DEVICE — SOL POVIDONE SCRUB IODINE 4 OZ

## (undated) DEVICE — GLOVE SENSICARE PI GRN 7

## (undated) DEVICE — TRAY DRY SKIN SCRUB PREP

## (undated) DEVICE — BLADE SURG STAINLESS STEEL #15

## (undated) DEVICE — SYR SALINE FLSH PRFL STRL 10ML

## (undated) DEVICE — TIP YANKAUERS BULB NO VENT

## (undated) DEVICE — DRAPE ARM DAVINCI XI

## (undated) DEVICE — SUT PDS II 2-0 CT-2 VIL

## (undated) DEVICE — IRRIGATOR ENDOSCOPY DISP.

## (undated) DEVICE — SOL NORMAL USPCA 0.9%

## (undated) DEVICE — KIT WING PAD POSITIONING

## (undated) DEVICE — COVER TIP CURVED SCISSORS XI

## (undated) DEVICE — DRAPE STERI INSTRUMENT 1018

## (undated) DEVICE — KIT INTERSTIM II PERC LEAD EXT

## (undated) DEVICE — TROCAR ENDOPATH XCEL 5X100MM

## (undated) DEVICE — ADHESIVE DERMABOND ADVANCED

## (undated) DEVICE — NDL HYPO REG 25G X 1 1/2

## (undated) DEVICE — SCREENER ISTIM EXT NEROSTIMLTR

## (undated) DEVICE — SPONGE GAUZE 16PLY 4X4

## (undated) DEVICE — TRAY DO THE ROBOT

## (undated) DEVICE — SUT MCRYL PLUS 4-0 PS2 27IN

## (undated) DEVICE — COVER PROBE US 5.5X58L NON LTX

## (undated) DEVICE — SYR IRRIGATION BULB STER 60ML

## (undated) DEVICE — PAD PREP CUFFED NS 24X48IN

## (undated) DEVICE — SOL IRR SOD CHL .9% POUR

## (undated) DEVICE — DRAPE COLUMN DAVINCI XI

## (undated) DEVICE — GLOVE SENSICARE PI SURG 6.5

## (undated) DEVICE — SUT VICRYL 0 27 CT-2

## (undated) DEVICE — SET TRI-LUMEN FILTERED TUBE

## (undated) DEVICE — DEVICE SNAPSECURE FOL CATH

## (undated) DEVICE — ELECTRODE REM PLYHSV RETURN 9

## (undated) DEVICE — DRAPE SURG W/TWL 17 5/8X23

## (undated) DEVICE — DRAPE UND BUTT W/POUCH 40X44IN

## (undated) DEVICE — DRAPE LAP T SHT W/ INSTR PAD

## (undated) DEVICE — GLOVE SENSICARE PI SURG 6

## (undated) DEVICE — SUT VICRYL 3-0 27 SH

## (undated) DEVICE — SUT ETHIBOND EXCEL 0 CT2 30

## (undated) DEVICE — SYS SEE SHARP SCP ANTIFG LNG

## (undated) DEVICE — TRAY CATH 1-LYR URIMTR 16FR